# Patient Record
Sex: MALE | Race: WHITE | NOT HISPANIC OR LATINO | Employment: FULL TIME | URBAN - METROPOLITAN AREA
[De-identification: names, ages, dates, MRNs, and addresses within clinical notes are randomized per-mention and may not be internally consistent; named-entity substitution may affect disease eponyms.]

---

## 2021-03-26 ENCOUNTER — OFFICE VISIT (OUTPATIENT)
Dept: INTERNAL MEDICINE | Age: 46
End: 2021-03-26

## 2021-03-26 VITALS
SYSTOLIC BLOOD PRESSURE: 130 MMHG | DIASTOLIC BLOOD PRESSURE: 90 MMHG | HEIGHT: 73 IN | WEIGHT: 198.8 LBS | BODY MASS INDEX: 26.35 KG/M2 | OXYGEN SATURATION: 97 % | HEART RATE: 72 BPM | TEMPERATURE: 98.4 F

## 2021-03-26 DIAGNOSIS — Z11.59 NEED FOR HEPATITIS C SCREENING TEST: ICD-10-CM

## 2021-03-26 DIAGNOSIS — E34.9 TESTOSTERONE DEFICIENCY: ICD-10-CM

## 2021-03-26 DIAGNOSIS — Z12.5 PROSTATE CANCER SCREENING: ICD-10-CM

## 2021-03-26 DIAGNOSIS — Z76.89 ENCOUNTER TO ESTABLISH CARE WITH NEW DOCTOR: Primary | ICD-10-CM

## 2021-03-26 DIAGNOSIS — Z00.00 ROUTINE ADULT HEALTH MAINTENANCE: ICD-10-CM

## 2021-03-26 DIAGNOSIS — R03.0 ELEVATED BLOOD PRESSURE READING: ICD-10-CM

## 2021-03-26 PROBLEM — R74.8 ELEVATED LIVER ENZYMES: Status: ACTIVE | Noted: 2017-06-30

## 2021-03-26 PROBLEM — N28.1 RENAL CYST: Status: ACTIVE | Noted: 2017-09-06

## 2021-03-26 PROCEDURE — 99203 OFFICE O/P NEW LOW 30 MIN: CPT | Performed by: NURSE PRACTITIONER

## 2021-03-26 RX ORDER — AMOXICILLIN 875 MG/1
875 TABLET, COATED ORAL 2 TIMES DAILY
COMMUNITY
Start: 2021-03-18 | End: 2021-03-28

## 2021-03-26 RX ORDER — AZELASTINE 1 MG/ML
2 SPRAY, METERED NASAL
COMMUNITY
Start: 2021-03-18 | End: 2021-04-02

## 2021-03-26 NOTE — PROGRESS NOTES
"    I N T E R N A L  M E D I C I N E  ZAINAB WEISS      ENCOUNTER DATE:  03/26/2021    Ever Villagran / 45 y.o. / male      CHIEF COMPLAINT / REASON FOR OFFICE VISIT     Establish Care      ASSESSMENT & PLAN     1. Encounter to establish care with new doctor    2. Elevated blood pressure reading  -Given log to monitor blood pressures twice daily once in the morning at night and bring to next appointment approximately in 1 month.  We will determine at this point if blood pressure medication is warranted    3. Prostate cancer screening  - PSA Screen -with testosterone treatment    4. Routine adult health maintenance  - CBC & Differential  - Comprehensive Metabolic Panel  - Hemoglobin A1c  - Lipid Panel With / Chol / HDL Ratio  - TSH+Free T4    5. Testosterone deficiency  - Testosterone, Free, Total    6. Need for hepatitis C screening test  - Hepatitis C Antibody    Orders Placed This Encounter   Procedures   • Comprehensive Metabolic Panel   • Hemoglobin A1c   • Lipid Panel With / Chol / HDL Ratio   • TSH+Free T4   • PSA Screen   • Hepatitis C Antibody   • Testosterone, Free, Total   • CBC & Differential     No orders of the defined types were placed in this encounter.      SUMMARY/DISCUSSION  • Plan to follow-up in 1 month for annual physical and hypertension follow-up with fasting labs performed today.    Next Appointment with me: 4/27/2021    Return in about 1 month (around 4/26/2021) for Annual physical fasting labs today .      VITAL SIGNS     Visit Vitals  /90   Pulse 72   Temp 98.4 °F (36.9 °C) (Temporal)   Ht 185.4 cm (73\")   Wt 90.2 kg (198 lb 12.8 oz)   SpO2 97%   BMI 26.23 kg/m²       Wt Readings from Last 3 Encounters:   03/26/21 90.2 kg (198 lb 12.8 oz)     Body mass index is 26.23 kg/m².      MEDICATIONS AT THE TIME OF OFFICE VISIT     Current Outpatient Medications on File Prior to Visit   Medication Sig   • amoxicillin (AMOXIL) 875 MG tablet Take 875 mg by mouth 2 (two) times a day.   • " azelastine (ASTELIN) 0.1 % nasal spray 2 sprays into the nostril(s) as directed by provider.   • esomeprazole (NexIUM) 5 MG packet Take  by mouth.     No current facility-administered medications on file prior to visit.         HISTORY OF PRESENT ILLNESS     Patient presents to establish care with our office.  Has not seen previous nurse practitioner within [facility] since 2017.     He recently went to an urgent care center on 3/18 for blood pressure was elevated and they suggested he establish care with a PCP for monitoring.  Urinalysis was negative.  He was given amoxicillin 875 mg tablet twice daily for 10 days along with azelastine nasal spray for symptoms at urgent care.  He is seeking full lab work-up today.    GERD: Uses Nexium daily with complete relief of symptoms.    Surgical history of tonsillectomy.    Family history of heart disease, heart attack, COPD, fibromyalgia multiple sclerosis.  Significant history of tobacco abuse and family members.    Patient has never smoked, has not drink alcohol in approximately 11 years.  No substance abuse.  Monogamous relationship no STI screenings needed.    Use of testosterone injections.  Patient is purchasing himself he is not monitored through hormone clinic or other provider.  Needs monitoring of PSA, CBC and potentially DEXA scan in the future.  Suspect elevation of blood pressure may be partially related to testosterone use.  Patient is very active and lives a healthy lifestyle.  Patient states that testosterone was actually deficient before he started treatment.    CT of abdomen and pelvis in 2017 a dominant 4.3 cm cyst in the lower pole of the right kidney was seen, but favored a benign etiology.  Radiology recommended renal sonogram in 1 year for surveillance which has not been completed.    REVIEW OF SYSTEMS     Constitutional neg except per HPI   Resp neg  CV neg    PHYSICAL EXAMINATION     Physical Exam  Constitutional  No distress  Cardiovascular Rate   normal . Rhythm: regular . Heart sounds:  normal  Pulmonary/Chest  Effort normal. Breath sounds:  normal  Psychiatric  Alert. Judgment and thought content normal. Mood normal     REVIEWED DATA     Labs:         Imaging:           Medical Tests:             Summary of old records / correspondence / consultant report:           Request outside records:           *Examiner was wearing medical surgical mask, face shield and exam gloves during the entire duration of the visit. Patient was masked the entire time.   Minimum social distance of 6 ft maintained entire visit except if physical contact was necessary as documented.     **Dragon Disclaimer:   Much of this encounter note is an electronic transcription/translation of spoken language to printed text. The electronic translation of spoken language may permit erroneous, or at times, nonsensical words or phrases to be inadvertently transcribed. Although I have reviewed the note for such errors, some may still exist.

## 2021-03-30 LAB
ALBUMIN SERPL-MCNC: 4.5 G/DL (ref 3.5–5.2)
ALBUMIN/GLOB SERPL: 2.3 G/DL
ALP SERPL-CCNC: 44 U/L (ref 39–117)
ALT SERPL-CCNC: 46 U/L (ref 1–41)
AST SERPL-CCNC: 41 U/L (ref 1–40)
BASOPHILS # BLD AUTO: 0.03 10*3/MM3 (ref 0–0.2)
BASOPHILS NFR BLD AUTO: 0.5 % (ref 0–1.5)
BILIRUB SERPL-MCNC: 0.4 MG/DL (ref 0–1.2)
BUN SERPL-MCNC: 29 MG/DL (ref 6–20)
BUN/CREAT SERPL: 21.5 (ref 7–25)
CALCIUM SERPL-MCNC: 9.5 MG/DL (ref 8.6–10.5)
CHLORIDE SERPL-SCNC: 103 MMOL/L (ref 98–107)
CHOLEST SERPL-MCNC: 169 MG/DL (ref 0–200)
CHOLEST/HDLC SERPL: 6.26 {RATIO}
CO2 SERPL-SCNC: 31.2 MMOL/L (ref 22–29)
CREAT SERPL-MCNC: 1.35 MG/DL (ref 0.76–1.27)
EOSINOPHIL # BLD AUTO: 0.06 10*3/MM3 (ref 0–0.4)
EOSINOPHIL NFR BLD AUTO: 1 % (ref 0.3–6.2)
ERYTHROCYTE [DISTWIDTH] IN BLOOD BY AUTOMATED COUNT: 12.7 % (ref 12.3–15.4)
GLOBULIN SER CALC-MCNC: 2 GM/DL
GLUCOSE SERPL-MCNC: 92 MG/DL (ref 65–99)
HBA1C MFR BLD: 5.4 % (ref 4.8–5.6)
HCT VFR BLD AUTO: 52.3 % (ref 37.5–51)
HCV AB S/CO SERPL IA: <0.1 S/CO RATIO (ref 0–0.9)
HDLC SERPL-MCNC: 27 MG/DL (ref 40–60)
HGB BLD-MCNC: 18.3 G/DL (ref 13–17.7)
IMM GRANULOCYTES # BLD AUTO: 0.02 10*3/MM3 (ref 0–0.05)
IMM GRANULOCYTES NFR BLD AUTO: 0.3 % (ref 0–0.5)
LDLC SERPL CALC-MCNC: 123 MG/DL (ref 0–100)
LYMPHOCYTES # BLD AUTO: 1.64 10*3/MM3 (ref 0.7–3.1)
LYMPHOCYTES NFR BLD AUTO: 26.1 % (ref 19.6–45.3)
MCH RBC QN AUTO: 31.9 PG (ref 26.6–33)
MCHC RBC AUTO-ENTMCNC: 35 G/DL (ref 31.5–35.7)
MCV RBC AUTO: 91.3 FL (ref 79–97)
MONOCYTES # BLD AUTO: 0.48 10*3/MM3 (ref 0.1–0.9)
MONOCYTES NFR BLD AUTO: 7.6 % (ref 5–12)
NEUTROPHILS # BLD AUTO: 4.06 10*3/MM3 (ref 1.7–7)
NEUTROPHILS NFR BLD AUTO: 64.5 % (ref 42.7–76)
NRBC BLD AUTO-RTO: 0 /100 WBC (ref 0–0.2)
PLATELET # BLD AUTO: 162 10*3/MM3 (ref 140–450)
POTASSIUM SERPL-SCNC: 5 MMOL/L (ref 3.5–5.2)
PROT SERPL-MCNC: 6.5 G/DL (ref 6–8.5)
PSA SERPL-MCNC: 0.43 NG/ML (ref 0–4)
RBC # BLD AUTO: 5.73 10*6/MM3 (ref 4.14–5.8)
SODIUM SERPL-SCNC: 143 MMOL/L (ref 136–145)
T4 FREE SERPL-MCNC: 1.21 NG/DL (ref 0.93–1.7)
TESTOST FREE SERPL-MCNC: >50 PG/ML (ref 6.8–21.5)
TESTOST SERPL-MCNC: 710 NG/DL (ref 264–916)
TRIGL SERPL-MCNC: 101 MG/DL (ref 0–150)
TSH SERPL DL<=0.005 MIU/L-ACNC: 1.45 UIU/ML (ref 0.27–4.2)
VLDLC SERPL CALC-MCNC: 19 MG/DL (ref 5–40)
WBC # BLD AUTO: 6.29 10*3/MM3 (ref 3.4–10.8)

## 2021-04-05 ENCOUNTER — IMMUNIZATION (OUTPATIENT)
Dept: VACCINE CLINIC | Facility: HOSPITAL | Age: 46
End: 2021-04-05

## 2021-04-05 PROCEDURE — 91300 HC SARSCOV02 VAC 30MCG/0.3ML IM: CPT | Performed by: INTERNAL MEDICINE

## 2021-04-05 PROCEDURE — 0001A: CPT | Performed by: INTERNAL MEDICINE

## 2021-04-08 ENCOUNTER — TELEPHONE (OUTPATIENT)
Dept: INTERNAL MEDICINE | Age: 46
End: 2021-04-08

## 2021-04-08 NOTE — TELEPHONE ENCOUNTER
Caller: Ever Villagran CRISTI    Relationship: Self    Best call back number: [phone]    Caller requesting test results: PATIENT     What test was performed:BLOOD WORK    When was the test performed: 03/26/21    Where was the test performed: [facility]    Additional notes: THE PATIENT WOULD LIKE FOR HIS TEST RESULTS FOR HIS BLOOD WORK AND STATES THAT HE HAS NOT SEEN THE RESULTS ON HIS MY[facility]. THE PATIENT WOULD LIKE A COPY OF HIS RESULTS AND WHAT IS AVAILABLE.

## 2021-04-21 ENCOUNTER — TELEPHONE (OUTPATIENT)
Dept: INTERNAL MEDICINE | Age: 46
End: 2021-04-21

## 2021-04-21 NOTE — TELEPHONE ENCOUNTER
Caller: Ever Villagran    Relationship to patient: Self    Best call back number: [phone]    Patient is needing: PATIENT IS CALLING TO STATE THAT AFTER SEEING MS JACOBO, HE DONATED BLOOD AND RECEIVED A LETTER THAT HE TESTED POSITIVE FOR HEPATITIS B.  HE IS WANTING TO KNOW WHAT MS PATEL SUGGESTS.  HE IS ALSO WANTING TO KNOW IF HE CAN GET AN RX CALLED IN FOR AN ANTIBIOTIC FOR SINUS INFECTION.  HE STATES HE WAS TREATED AT THE URGENT CARE WITH AN ANTIBIOTIC BUT STILL HAS THE SINUS INFECTION.  HE STATES HE STILL COUGHING UP GREEN STUFF, PRESSURE IN HEAD AND ACHING IN TEETH.    PLEASE ADVISE.        [facility] [address] AT [facility] - [phone] [facility] [phone]   [phone]

## 2021-04-22 DIAGNOSIS — R74.8 ELEVATED LIVER ENZYMES: Primary | ICD-10-CM

## 2021-04-22 RX ORDER — AZITHROMYCIN 250 MG/1
TABLET, FILM COATED ORAL
Qty: 6 TABLET | Refills: 0 | Status: SHIPPED | OUTPATIENT
Start: 2021-04-22 | End: 2021-04-27

## 2021-04-26 ENCOUNTER — IMMUNIZATION (OUTPATIENT)
Dept: VACCINE CLINIC | Facility: HOSPITAL | Age: 46
End: 2021-04-26

## 2021-04-26 PROCEDURE — 91300 HC SARSCOV02 VAC 30MCG/0.3ML IM: CPT | Performed by: INTERNAL MEDICINE

## 2021-04-26 PROCEDURE — 0002A: CPT | Performed by: INTERNAL MEDICINE

## 2021-04-27 ENCOUNTER — OFFICE VISIT (OUTPATIENT)
Dept: INTERNAL MEDICINE | Age: 46
End: 2021-04-27

## 2021-04-27 VITALS
DIASTOLIC BLOOD PRESSURE: 72 MMHG | OXYGEN SATURATION: 97 % | WEIGHT: 203 LBS | HEART RATE: 80 BPM | TEMPERATURE: 98.4 F | SYSTOLIC BLOOD PRESSURE: 120 MMHG | BODY MASS INDEX: 26.9 KG/M2 | HEIGHT: 73 IN

## 2021-04-27 DIAGNOSIS — R74.8 ELEVATED LIVER ENZYMES: ICD-10-CM

## 2021-04-27 DIAGNOSIS — Z00.00 ENCOUNTER FOR ANNUAL PHYSICAL EXAM: Primary | ICD-10-CM

## 2021-04-27 DIAGNOSIS — L25.9 CONTACT DERMATITIS, UNSPECIFIED CONTACT DERMATITIS TYPE, UNSPECIFIED TRIGGER: ICD-10-CM

## 2021-04-27 DIAGNOSIS — N28.1 RENAL CYST, RIGHT: ICD-10-CM

## 2021-04-27 DIAGNOSIS — Z12.11 SCREENING FOR MALIGNANT NEOPLASM OF COLON: ICD-10-CM

## 2021-04-27 PROCEDURE — 99396 PREV VISIT EST AGE 40-64: CPT | Performed by: NURSE PRACTITIONER

## 2021-04-27 PROCEDURE — 99214 OFFICE O/P EST MOD 30 MIN: CPT | Performed by: NURSE PRACTITIONER

## 2021-04-27 RX ORDER — METHYLPREDNISOLONE 4 MG/1
TABLET ORAL
Qty: 1 EACH | Refills: 0 | Status: SHIPPED | OUTPATIENT
Start: 2021-04-27 | End: 2021-05-25

## 2021-04-27 NOTE — PROGRESS NOTES
"[facility]  Azam Frye, ZAINAB Huy / 46 y.o. / male  04/27/2021                        VITALS     Visit Vitals  /72   Pulse 80   Temp 98.4 °F (36.9 °C) (Temporal)   Ht 185.4 cm (73\")   Wt 92.1 kg (203 lb)   SpO2 97%   BMI 26.78 kg/m²       BP Readings from Last 3 Encounters:   04/27/21 120/72   03/26/21 130/90   11/23/20 (!) 160/101     Wt Readings from Last 3 Encounters:   04/27/21 92.1 kg (203 lb)   03/26/21 90.2 kg (198 lb 12.8 oz)      Body mass index is 26.78 kg/m².    MEDICATIONS     Current Outpatient Medications   Medication Sig Dispense Refill   • esomeprazole (NexIUM) 5 MG packet Take  by mouth.     • azithromycin (Zithromax Z-Wing) 250 MG tablet Take 2 tablets by mouth on day 1, then 1 tablet daily on days 2-5 6 tablet 0   • methylPREDNISolone (MEDROL) 4 MG dose pack Take as directed on package instructions. 1 each 0     No current facility-administered medications for this visit.       _____________________________________________________________________________________    CHIEF COMPLAINT     Annual Exam and Rash (BUE)      HISTORY OF PRESENT ILLNESS      Ever presents for annual health maintenance visit.    · Last health maintenance visit: many years ago  · General health: good  · Lifestyle:  · Attempting to lose weight?: No   · Diet: eats a well balanced, healthy diet  · Exercise: exercises nearly every day  · Tobacco: Never used   · Alcohol: does not drink  · Work: Full-time  · Reproductive health:  · Sexually active?: Yes   · Concern for STD?: No   · Sexual problems?: No problems   · Sees Urologist?: No   · Depression Screening:      PHQ-2/PHQ-9 Depression Screening 3/26/2021   Little interest or pleasure in doing things 0   Feeling down, depressed, or hopeless 0   Total Score 0         PHQ-2: 0 (Not depressed)     PHQ-9: 0 (Negative screening for depression)    Patient Care Team:  Azam Heller APRN as PCP - General (Internal " Medicine)  ______________________________________________________________________    ALLERGIES  No Known Allergies     PFSH:     The following portions of the patient's history were reviewed and updated as appropriate: Allergies / Current Medications / Past Medical History / Surgical History / Social History / Family History    PROBLEM LIST   Patient Active Problem List   Diagnosis   • Elevated liver enzymes   • Renal cyst       PAST MEDICAL HISTORY  Past Medical History:   Diagnosis Date   • GERD (gastroesophageal reflux disease)        SURGICAL HISTORY  Past Surgical History:   Procedure Laterality Date   • TONSILLECTOMY         SOCIAL HISTORY  Social History     Socioeconomic History   • Marital status:      Spouse name: Not on file   • Number of children: Not on file   • Years of education: Not on file   • Highest education level: Not on file   Tobacco Use   • Smoking status: Never Smoker   • Smokeless tobacco: Never Used   Vaping Use   • Vaping Use: Never used   Substance and Sexual Activity   • Alcohol use: Never     Comment: none 11 years    • Drug use: Never   • Sexual activity: Yes     Partners: Female     Birth control/protection: None       FAMILY HISTORY  Family History   Problem Relation Age of Onset   • Heart disease Mother    • Heart attack Mother    • Emphysema Father    • COPD Father    • Multiple sclerosis Sister    • Fibromyalgia Sister    • Heart attack Maternal Grandmother    • Heart attack Maternal Grandfather    • Emphysema Paternal Grandfather        IMMUNIZATION HISTORY  Immunization History   Administered Date(s) Administered   • COVID-19 (PFIZER) 04/05/2021, 04/26/2021       ______________________________________________________________________    REVIEW OF SYSTEMS     Review of Systems   Constitutional: Negative.    HENT: Negative.    Eyes: Negative.    Respiratory: Negative.    Cardiovascular: Negative.    Gastrointestinal: Negative.    Endocrine: Negative.    Genitourinary:  Negative.    Musculoskeletal: Negative.    Skin: Positive for rash.   Allergic/Immunologic: Negative.    Neurological: Negative.    Hematological: Negative.    Psychiatric/Behavioral: Negative.      PHYSICAL EXAMINATION     Physical Exam  Constitutional:       Appearance: Normal appearance.   HENT:      Head: Normocephalic.      Right Ear: Tympanic membrane normal.      Left Ear: Tympanic membrane normal.      Nose: Nose normal.      Mouth/Throat:      Mouth: Mucous membranes are moist.   Eyes:      Conjunctiva/sclera: Conjunctivae normal.      Pupils: Pupils are equal, round, and reactive to light.   Neck:      Thyroid: No thyromegaly.      Vascular: No carotid bruit.   Cardiovascular:      Rate and Rhythm: Normal rate and regular rhythm.      Pulses: Normal pulses.      Heart sounds: Normal heart sounds.   Pulmonary:      Effort: Pulmonary effort is normal.      Breath sounds: Normal breath sounds.   Abdominal:      Palpations: Abdomen is soft.      Tenderness: There is no right CVA tenderness or left CVA tenderness.   Genitourinary:     Comments: Defer until 51  Musculoskeletal:         General: Normal range of motion.      Cervical back: Normal range of motion.      Right lower leg: No edema.      Left lower leg: No edema.   Lymphadenopathy:      Cervical: No cervical adenopathy.   Skin:     Findings: Rash (bilateral forearm ) present.   Neurological:      Mental Status: He is alert and oriented to person, place, and time.   Psychiatric:         Thought Content: Thought content normal.         Judgment: Judgment normal.        REVIEWED DATA      Labs:    Lab Results   Component Value Date     03/26/2021    K 5.0 03/26/2021    CALCIUM 9.5 03/26/2021    AST 41 (H) 03/26/2021    ALT 46 (H) 03/26/2021    BUN 29 (H) 03/26/2021    CREATININE 1.35 (H) 03/26/2021    EGFRIFNONA 57 (L) 03/26/2021    EGFRIFAFRI 70 03/26/2021       Lab Results   Component Value Date    GLU 92 03/26/2021    HGBA1C 5.40 03/26/2021     TSH 1.450 03/26/2021    FREET4 1.21 03/26/2021       Lab Results   Component Value Date    PSA 0.432 03/26/2021    TESTOSTEROTT 710 03/26/2021    TESTFRE >50.0 (H) 03/26/2021       Lab Results   Component Value Date     (H) 03/26/2021    HDL 27 (L) 03/26/2021    TRIG 101 03/26/2021    CHOLHDLRATIO 6.26 03/26/2021       No components found for: [id number]    Lab Results   Component Value Date    WBC 6.29 03/26/2021    HGB 18.3 (H) 03/26/2021    MCV 91.3 03/26/2021     03/26/2021       No results found for: PROTEIN, GLUCOSEU, BLOODU, NITRITEU, LEUKOCYTESUR       Lab Results   Component Value Date    HEPCVIRUSABY <0.1 03/26/2021       Imaging:           Medical Tests:       ______________________________________________________________________    ASSESSMENT & PLAN     ANNUAL WELLNESS EXAM / PHYSICAL     Other medical problems addressed today:  Contact dermatitis: New chemical used in cleaning equipment at his work.  Bilateral pruritic and erythematous rash to bilateral forearms.  Has tried over-the-counter topical corticosteroid creams with no improvement.    4.3 cm right kidney cyst found on previous CT abdomen and pelvis in October 2017.  Follow-up is recommended in 1 year but was never completed.  Acceptable to renal ultrasound today.    Elevated liver enzymes: Mild elevation.  Patient recently attempted to get blood at the [facility] however hepatitis B core antibody test was reactive.  Hepatitis B surface antigen test was nonreactive along with HPV discriminatory KILEY.  He is advised to follow-up with his primary care provider with these findings.  No GI symptoms at this time.    Patient blood pressure pain: Has brought blood pressure log from home which revealed blood pressure in the 1 teens to 120s over 70s to 80s as an average.  Heart rate averages 60 to 70s.    Summary/Discussion:     · Recent Pfizer dose on April 26, commended hepatitis A along with tetanus at pharmacy.   · Continue  exercise regimen and healthy eating.  · With hepatitis B surface antigen test nonreactive hepatitis B core antibody test reactive we will check hepatitis B surface antibody to see if patient is immune or needs immunization of hepatitis B. liver enzymes were also mildly elevated we will recheck today.  · Blood pressure log shows normal blood pressure.  Need for medication at this time.  · Discussed annual physical labs which did show some elevation hemoglobin hematocrit likely due to testosterone use.  Testosterone 710 with injections.  Prostate is in normal range.  Otherwise unremarkable.  · Need for colonoscopy.  Acceptable for referral.  · Ordered ultrasound renal due to previous 4.3 cm right kidney cyst.  · Contact dermatitis, failed topical steroidal cream.  Order for Medrol pack.  Benadryl for itching.  · Plan for follow-up in 6 months for chronic medical follow-up and lab updates.    Next Appointment with me: Visit date not found    Return in about 6 months (around 10/27/2021) for Next scheduled follow up.      HEALTHCARE MAINTENANCE ISSUES       Cancer Screening:  · Colon: Initial/Next screening at age: 46  · Repeat colon cancer screening: N/A at this time  · Prostate: Continue screening annually.   · Testicular: Recommended monthly self exam  · Skin: Monthly self skin examination, annual exam by health professional  · Lung: Does not meet criteria for lung cancer screening.   · Other:    Screening Labs & Tests:  · Lab results reviewed & discussed with with patient or orders placed today.  · EKG:  · CV Screening: Lipid panel  · DEXA (75+ or risk factors):   · HEP C (If born [birth year]158-3546 or risk factors): Negative screen  · Other:     Immunization/Vaccinations (to be given today unless deferred by patient)  · Influenza: Patient missed the flu shot but plans to get it this season  · Hepatitis A: Recommended here or at pharmacy  · Tetanus/Pertussis: Recommended here or at pharmacy  · Pneumovax: Not needed at this  time  · Shingles: Not needed at this time  · Other:     Lifestyle Counseling:  · Lifestyle Modifications: Continue current health regimen.   · Safety Issues: Always wear seatbelt, Avoid texting while driving   · Use sunscreen, regular skin examination  · Recommended annual dental/vision examination.  · Emotional/Stress/Sleep: Reviewed and  given when appropriate      Health Maintenance   Topic Date Due   • COLONOSCOPY  Never done   • ANNUAL PHYSICAL  Never done   • TDAP/TD VACCINES (1 - Tdap) Never done   • INFLUENZA VACCINE  08/01/2021   • HEPATITIS C SCREENING  Completed   • COVID-19 Vaccine  Completed   • Pneumococcal Vaccine 0-64  Aged Out         Examiner was wearing KN95 mask, face shield and exam gloves during the entire duration of the visit. Patient was masked the entire time.   Minimum social distance of 6 ft maintained entire visit except if physical contact was necessary as documented.     **Dragon Disclaimer:   Much of this encounter note is an electronic transcription/translation of spoken language to printed text. The electronic translation of spoken language may permit erroneous, or at times, nonsensical words or phrases to be inadvertently transcribed. Although I have reviewed the note for such errors, some may still exist.

## 2021-04-28 LAB
ALBUMIN SERPL-MCNC: 4.4 G/DL (ref 3.5–5.2)
ALP SERPL-CCNC: 45 U/L (ref 39–117)
ALT SERPL-CCNC: 35 U/L (ref 1–41)
AST SERPL-CCNC: 42 U/L (ref 1–40)
BILIRUB DIRECT SERPL-MCNC: <0.2 MG/DL (ref 0–0.3)
BILIRUB SERPL-MCNC: 0.5 MG/DL (ref 0–1.2)
HBV SURFACE AB SER QL: REACTIVE
PROT SERPL-MCNC: 6.4 G/DL (ref 6–8.5)

## 2021-05-14 ENCOUNTER — HOSPITAL ENCOUNTER (OUTPATIENT)
Dept: ULTRASOUND IMAGING | Facility: HOSPITAL | Age: 46
Discharge: HOME | End: 2021-05-14
Admitting: NURSE PRACTITIONER

## 2021-05-14 DIAGNOSIS — N28.1 RENAL CYST, RIGHT: ICD-10-CM

## 2021-05-14 PROCEDURE — 76775 US EXAM ABDO BACK WALL LIM: CPT

## 2021-05-24 ENCOUNTER — TELEPHONE (OUTPATIENT)
Dept: INTERNAL MEDICINE | Age: 46
End: 2021-05-24

## 2021-05-24 NOTE — TELEPHONE ENCOUNTER
PATIENT'S DOCTOR IS NOT GOING BE ABLE TO GIVE HIS XRAYS TO HIM UNTIL THIS EVENING SO HE WON'T BE ABLE TO GET THEM TO [facility] UNTIL IN THE MORNING.     CONTACT: [phone] (h)

## 2021-05-24 NOTE — TELEPHONE ENCOUNTER
Pt sending images at this time.  C/O sharp pain with breathing at T-10,  Injury noted 10 days ago at work turned wrong at the waist. Stated swelling noted on X-ray.  Did not go to [facility], went to Chiropractor.   Denied numbness, tingling, leg pain

## 2021-05-24 NOTE — TELEPHONE ENCOUNTER
"Messaged patient directly:     \"I just saw the messages that I am unable to see your xrays until tomorrow.     I will take a look as soon as I see them.     However, I may need further imaging or xrays if we need to treat your pain or symptoms. I have some same day availability if you need to come see.     Feel better!\"  "

## 2021-05-24 NOTE — TELEPHONE ENCOUNTER
Caller: Tyshawn Ever L    Relationship: Self    Best call back number: [phone]    What medication are you requesting: STEROID OR ANTI INFLAMMATORY    What are your current symptoms:SEVERE BACK PAIN AND RIB OUT OF PLACE    How long have you been experiencing symptoms: ABOUT 10 DAYS    If a prescription is needed, what is your preferred pharmacy and phone number:  [facility] [address] [facility] [address] AT [facility] & ([facility] [phone] [facility] [phone]    Additional notes:PATIENT STATES THAT HIS BACK HAS BEEN HURTING FOR ABOUT 10 DAYS. HE WENT FOR AN X-RAY AND STATED THAT HE HAS THOSE RESULTS IF YOU WOULD LIKE TO SEE THEM. PATIENT STATES THAT HE HAS A CHIROPRACTOR APPOINTMENT TODAY AS WELL. PLEASE CALL PATIENT AND ADVISE.

## 2021-05-25 ENCOUNTER — OFFICE VISIT (OUTPATIENT)
Dept: INTERNAL MEDICINE | Age: 46
End: 2021-05-25

## 2021-05-25 VITALS
DIASTOLIC BLOOD PRESSURE: 86 MMHG | OXYGEN SATURATION: 97 % | HEART RATE: 69 BPM | SYSTOLIC BLOOD PRESSURE: 130 MMHG | BODY MASS INDEX: 27.25 KG/M2 | TEMPERATURE: 97.7 F | WEIGHT: 205.6 LBS | HEIGHT: 73 IN

## 2021-05-25 DIAGNOSIS — S23.9XXA THORACIC BACK SPRAIN, INITIAL ENCOUNTER: Primary | ICD-10-CM

## 2021-05-25 PROCEDURE — 96372 THER/PROPH/DIAG INJ SC/IM: CPT | Performed by: NURSE PRACTITIONER

## 2021-05-25 PROCEDURE — 99213 OFFICE O/P EST LOW 20 MIN: CPT | Performed by: NURSE PRACTITIONER

## 2021-05-25 RX ORDER — CYCLOBENZAPRINE HCL 5 MG
5 TABLET ORAL 3 TIMES DAILY PRN
Qty: 15 TABLET | Refills: 0 | Status: SHIPPED | OUTPATIENT
Start: 2021-05-25 | End: 2021-08-19

## 2021-05-25 RX ORDER — METHYLPREDNISOLONE ACETATE 80 MG/ML
80 INJECTION, SUSPENSION INTRA-ARTICULAR; INTRALESIONAL; INTRAMUSCULAR; SOFT TISSUE ONCE
Status: COMPLETED | OUTPATIENT
Start: 2021-05-25 | End: 2021-05-25

## 2021-05-25 RX ADMIN — METHYLPREDNISOLONE ACETATE 80 MG: 80 INJECTION, SUSPENSION INTRA-ARTICULAR; INTRALESIONAL; INTRAMUSCULAR; SOFT TISSUE at 13:08

## 2021-05-25 NOTE — PROGRESS NOTES
"    I N T E R N A L  M E D I C I N E  MARA JACOBO, APRN      ENCOUNTER DATE:  05/25/2021    Ever Villagran / 46 y.o. / male      CHIEF COMPLAINT / REASON FOR OFFICE VISIT     Back Pain      ASSESSMENT & PLAN     1. Thoracic back sprain, initial encounter  - methylPREDNISolone acetate (DEPO-medrol) injection 80 mg  - Flexeril 5mg TID PRN     No orders of the defined types were placed in this encounter.    New Medications Ordered This Visit   Medications   • cyclobenzaprine (FLEXERIL) 5 MG tablet     Sig: Take 1 tablet by mouth 3 (Three) Times a Day As Needed for Muscle Spasms.     Dispense:  15 tablet     Refill:  0   • methylPREDNISolone acetate (DEPO-medrol) injection 80 mg       SUMMARY/DISCUSSION  • Follow-up if symptoms do not improve or worsen      Next Appointment with me: 10/28/2021    No follow-ups on file.      VITAL SIGNS     Visit Vitals  /86   Pulse 69   Temp 97.7 °F (36.5 °C) (Temporal)   Ht 185.4 cm (73\")   Wt 93.3 kg (205 lb 9.6 oz)   SpO2 97%   BMI 27.13 kg/m²     Wt Readings from Last 3 Encounters:   05/25/21 93.3 kg (205 lb 9.6 oz)   04/27/21 92.1 kg (203 lb)   03/26/21 90.2 kg (198 lb 12.8 oz)     Body mass index is 27.13 kg/m².      MEDICATIONS AT THE TIME OF OFFICE VISIT     Current Outpatient Medications on File Prior to Visit   Medication Sig   • esomeprazole (NexIUM) 5 MG packet Take  by mouth.   • [DISCONTINUED] methylPREDNISolone (MEDROL) 4 MG dose pack Take as directed on package instructions.     No current facility-administered medications on file prior to visit.         HISTORY OF PRESENT ILLNESS     Patient presents with left thoracic back pain over the last few days after labor-intensive day with the patient.  He has been going to his chiropractor who is performed imaging of his spine.  Chiropractor is suggesting steroid injection to decrease inflammation along with tightness of his muscles.  No numbness or tingling bilateral lower extremities or loss of bowel or bladder.  Minimal " decreased range of motion due to pain.     REVIEW OF SYSTEMS     Constitutional neg except per HPI   Resp neg  CV neg  Musc thoracic pain and tightness     PHYSICAL EXAMINATION     Physical Exam  Constitutional  No distress  Cardiovascular Rate  normal . Rhythm: regular . Heart sounds:  normal  Pulmonary/Chest  Effort normal. Breath sounds:  normal  Musc left thoracic pain with palpation   Psychiatric  Alert. Judgment and thought content normal. Mood normal     REVIEWED DATA     Labs:   Lab Results   Component Value Date    BUN 29 (H) 03/26/2021    CREATININE 1.35 (H) 03/26/2021    EGFRIFNONA 57 (L) 03/26/2021    EGFRIFAFRI 70 03/26/2021    BCR 21.5 03/26/2021    K 5.0 03/26/2021    CO2 31.2 (H) 03/26/2021    CALCIUM 9.5 03/26/2021    PROTENTOTREF 6.4 04/27/2021    ALBUMIN 4.40 04/27/2021    LABIL2 2.3 03/26/2021    AST 42 (H) 04/27/2021    ALT 35 04/27/2021       Imaging:           Medical Tests:             Summary of old records / correspondence / consultant report:           Request outside records:           *Examiner was wearing medical surgical mask, face shield and exam gloves during the entire duration of the visit. Patient was masked the entire time.   Minimum social distance of 6 ft maintained entire visit except if physical contact was necessary as documented.     **Dragon Disclaimer:   Much of this encounter note is an electronic transcription/translation of spoken language to printed text. The electronic translation of spoken language may permit erroneous, or at times, nonsensical words or phrases to be inadvertently transcribed. Although I have reviewed the note for such errors, some may still exist.

## 2021-06-01 ENCOUNTER — HOSPITAL ENCOUNTER (OUTPATIENT)
Dept: GENERAL RADIOLOGY | Facility: HOSPITAL | Age: 46
Discharge: HOME | End: 2021-06-01

## 2021-06-01 DIAGNOSIS — M54.50 LUMBAR PAIN: Primary | ICD-10-CM

## 2021-06-01 DIAGNOSIS — M54.50 LUMBAR PAIN: ICD-10-CM

## 2021-06-01 DIAGNOSIS — M54.6 THORACIC BACK PAIN, UNSPECIFIED BACK PAIN LATERALITY, UNSPECIFIED CHRONICITY: ICD-10-CM

## 2021-06-01 PROCEDURE — 72100 X-RAY EXAM L-S SPINE 2/3 VWS: CPT

## 2021-06-01 PROCEDURE — 72072 X-RAY EXAM THORAC SPINE 3VWS: CPT

## 2021-06-02 DIAGNOSIS — M51.34 DDD (DEGENERATIVE DISC DISEASE), THORACIC: Primary | ICD-10-CM

## 2021-06-02 DIAGNOSIS — M51.36 DDD (DEGENERATIVE DISC DISEASE), LUMBAR: ICD-10-CM

## 2021-06-15 ENCOUNTER — TELEPHONE (OUTPATIENT)
Dept: INTERNAL MEDICINE | Age: 46
End: 2021-06-15

## 2021-06-15 NOTE — TELEPHONE ENCOUNTER
----- Message from ZAINAB Allan sent at 6/14/2021  8:30 AM EDT -----  MRI revised to external. Ready to fax to [facility]

## 2021-07-06 ENCOUNTER — APPOINTMENT (OUTPATIENT)
Dept: MRI IMAGING | Facility: HOSPITAL | Age: 46
End: 2021-07-06

## 2021-07-06 ENCOUNTER — HOSPITAL ENCOUNTER (OUTPATIENT)
Dept: MRI IMAGING | Facility: HOSPITAL | Age: 46
Discharge: HOME | End: 2021-07-06
Admitting: NURSE PRACTITIONER

## 2021-07-06 DIAGNOSIS — M51.36 DDD (DEGENERATIVE DISC DISEASE), LUMBAR: ICD-10-CM

## 2021-07-06 PROCEDURE — 72148 MRI LUMBAR SPINE W/O DYE: CPT

## 2021-07-14 ENCOUNTER — TELEPHONE (OUTPATIENT)
Dept: INTERNAL MEDICINE | Age: 46
End: 2021-07-14

## 2021-07-14 RX ORDER — TRIAMCINOLONE ACETONIDE 0.25 MG/G
OINTMENT TOPICAL 2 TIMES DAILY
Qty: 15 G | Refills: 1 | Status: SHIPPED | OUTPATIENT
Start: 2021-07-14 | End: 2021-07-15 | Stop reason: SDUPTHER

## 2021-07-14 NOTE — TELEPHONE ENCOUNTER
Patient sent msg below. We have no [facility] listed in his chart so I sent him a msg asking him what [facility] he is using. You may have to check for the msg in Patient Advise.       Ok did you send that to the [facility]? [facility] doesn't take my insurance that I have currently. My new insurance starts august 1st.

## 2021-07-15 RX ORDER — TRIAMCINOLONE ACETONIDE 0.25 MG/G
OINTMENT TOPICAL 2 TIMES DAILY
Qty: 15 G | Refills: 1 | Status: SHIPPED | OUTPATIENT
Start: 2021-07-15 | End: 2021-07-29

## 2021-08-19 ENCOUNTER — TELEPHONE (OUTPATIENT)
Dept: INTERNAL MEDICINE | Age: 46
End: 2021-08-19

## 2021-08-19 NOTE — TELEPHONE ENCOUNTER
----- Message from Aleksandra Jones LPN sent at 8/19/2021  6:56 AM EDT -----  Regarding: FW: Non-Urgent Medical Question  Contact: [phone]    ----- Message -----  From: Ever Villagran  Sent: 8/18/2021  12:25 PM EDT  To: [facility]  Subject: Non-Urgent Medical Question                      Anything for meek? I know sounds weird and out of the ordinary, but I have pinkeye.

## 2021-09-21 ENCOUNTER — OFFICE VISIT (OUTPATIENT)
Dept: INTERNAL MEDICINE | Age: 46
End: 2021-09-21

## 2021-09-21 ENCOUNTER — LAB (OUTPATIENT)
Dept: LAB | Facility: HOSPITAL | Age: 46
End: 2021-09-21

## 2021-09-21 ENCOUNTER — HOSPITAL ENCOUNTER (OUTPATIENT)
Dept: GENERAL RADIOLOGY | Facility: HOSPITAL | Age: 46
Discharge: HOME | End: 2021-09-21

## 2021-09-21 VITALS
TEMPERATURE: 97.8 F | OXYGEN SATURATION: 99 % | HEART RATE: 68 BPM | DIASTOLIC BLOOD PRESSURE: 84 MMHG | WEIGHT: 200.6 LBS | SYSTOLIC BLOOD PRESSURE: 132 MMHG | HEIGHT: 73 IN | BODY MASS INDEX: 26.59 KG/M2

## 2021-09-21 DIAGNOSIS — R05.9 COUGH: Primary | ICD-10-CM

## 2021-09-21 LAB
BASOPHILS # BLD AUTO: 0.03 10*3/MM3 (ref 0–0.2)
BASOPHILS NFR BLD AUTO: 0.5 % (ref 0–1.5)
DEPRECATED RDW RBC AUTO: 43.9 FL (ref 37–54)
EOSINOPHIL # BLD AUTO: 0.07 10*3/MM3 (ref 0–0.4)
EOSINOPHIL NFR BLD AUTO: 1.1 % (ref 0.3–6.2)
ERYTHROCYTE [DISTWIDTH] IN BLOOD BY AUTOMATED COUNT: 13.8 % (ref 12.3–15.4)
HCT VFR BLD AUTO: 49.6 % (ref 37.5–51)
HGB BLD-MCNC: 16.9 G/DL (ref 13–17.7)
IMM GRANULOCYTES # BLD AUTO: 0.01 10*3/MM3 (ref 0–0.05)
IMM GRANULOCYTES NFR BLD AUTO: 0.2 % (ref 0–0.5)
LYMPHOCYTES # BLD AUTO: 1.67 10*3/MM3 (ref 0.7–3.1)
LYMPHOCYTES NFR BLD AUTO: 25.7 % (ref 19.6–45.3)
MCH RBC QN AUTO: 30.2 PG (ref 26.6–33)
MCHC RBC AUTO-ENTMCNC: 34.1 G/DL (ref 31.5–35.7)
MCV RBC AUTO: 88.6 FL (ref 79–97)
MONOCYTES # BLD AUTO: 0.56 10*3/MM3 (ref 0.1–0.9)
MONOCYTES NFR BLD AUTO: 8.6 % (ref 5–12)
NEUTROPHILS NFR BLD AUTO: 4.17 10*3/MM3 (ref 1.7–7)
NEUTROPHILS NFR BLD AUTO: 63.9 % (ref 42.7–76)
NRBC BLD AUTO-RTO: 0 /100 WBC (ref 0–0.2)
PLATELET # BLD AUTO: 178 10*3/MM3 (ref 140–450)
PMV BLD AUTO: 11 FL (ref 6–12)
RBC # BLD AUTO: 5.6 10*6/MM3 (ref 4.14–5.8)
WBC # BLD AUTO: 6.51 10*3/MM3 (ref 3.4–10.8)

## 2021-09-21 PROCEDURE — 85025 COMPLETE CBC W/AUTO DIFF WBC: CPT | Performed by: NURSE PRACTITIONER

## 2021-09-21 PROCEDURE — 36415 COLL VENOUS BLD VENIPUNCTURE: CPT | Performed by: NURSE PRACTITIONER

## 2021-09-21 PROCEDURE — 99213 OFFICE O/P EST LOW 20 MIN: CPT | Performed by: NURSE PRACTITIONER

## 2021-09-21 PROCEDURE — 71046 X-RAY EXAM CHEST 2 VIEWS: CPT

## 2021-09-21 RX ORDER — PROMETHAZINE HYDROCHLORIDE AND CODEINE PHOSPHATE 6.25; 1 MG/5ML; MG/5ML
5 SYRUP ORAL EVERY 4 HOURS PRN
Qty: 118 ML | Refills: 0 | Status: SHIPPED | OUTPATIENT
Start: 2021-09-21 | End: 2021-10-28

## 2021-09-21 RX ORDER — BENZONATATE 100 MG/1
100 CAPSULE ORAL 3 TIMES DAILY PRN
Qty: 30 CAPSULE | Refills: 1 | Status: SHIPPED | OUTPATIENT
Start: 2021-09-21 | End: 2021-10-28

## 2021-09-21 NOTE — PROGRESS NOTES
"    I N T E R N A L  M E D I C I N E  MARA JACOBO, APRN      ENCOUNTER DATE:  09/21/2021    Ever Villagran / 46 y.o. / male      CHIEF COMPLAINT / REASON FOR OFFICE VISIT     Cough (6 wk post exposure Covid 19) and Labs Only      ASSESSMENT & PLAN     1. Cough  -Tessalon Perles as needed for daytime cough  - CBC w AUTO Differential  - promethazine-codeine (PHENERGAN with CODEINE) 6.25-10 MG/5ML syrup; Take 5 mL by mouth Every 4 (Four) Hours As Needed for Cough.  Dispense: 118 mL; Refill: 0  - XR Chest PA & Lateral    Orders Placed This Encounter   Procedures   • XR Chest PA & Lateral   • CBC Auto Differential   • CBC w AUTO Differential     New Medications Ordered This Visit   Medications   • promethazine-codeine (PHENERGAN with CODEINE) 6.25-10 MG/5ML syrup     Sig: Take 5 mL by mouth Every 4 (Four) Hours As Needed for Cough.     Dispense:  118 mL     Refill:  0   • benzonatate (Tessalon Perles) 100 MG capsule     Sig: Take 1 capsule by mouth 3 (Three) Times a Day As Needed for Cough.     Dispense:  30 capsule     Refill:  1       SUMMARY/DISCUSSION  • if negative chest x-ray along with CBC, if symptoms do not improve with cough suppressants will consider pulmonary function test for further evaluation.  • Continue Trelegy as given by NP peer.     Next Appointment with me: 10/28/2021    No follow-ups on file.      VITAL SIGNS     Visit Vitals  /84 (Cuff Size: Large Adult)   Pulse 68   Temp 97.8 °F (36.6 °C) (Temporal)   Ht 185.4 cm (73\")   Wt 91 kg (200 lb 9.6 oz)   SpO2 99%   BMI 26.47 kg/m²       @BP@  Wt Readings from Last 3 Encounters:   09/21/21 91 kg (200 lb 9.6 oz)   05/25/21 93.3 kg (205 lb 9.6 oz)   04/27/21 92.1 kg (203 lb)     Body mass index is 26.47 kg/m².      MEDICATIONS AT THE TIME OF OFFICE VISIT     Current Outpatient Medications on File Prior to Visit   Medication Sig   • esomeprazole (NexIUM) 5 MG packet Take  by mouth.   • [DISCONTINUED] azithromycin (ZITHROMAX) 250 MG tablet Take 2 tablets " the first day, then 1 tablet daily for 4 days.     No current facility-administered medications on file prior to visit.         HISTORY OF PRESENT ILLNESS     Patient presents with approximately 6 weeks post Covid nonproductive cough. No significant shortness of breath. No fever, chills or malaise. No significant chest tightness but he does feel as if he cannot get a fully deep breath. Decompensation as far as his normal exercise regimen. He prescribed cough syrup from urgent care which has not been showing improvement. He has been using a Trelegy inhaler in which he was given by a nurse practitioner friend at his wellness practice. This has been improving his symptoms. Overall, he states that his symptoms are improving slowly rather than worsening.     REVIEW OF SYSTEMS     Constitutional neg except per HPI   Resp neg  CV dry cough    PHYSICAL EXAMINATION     Physical Exam  Constitutional  No distress  Cardiovascular Rate  normal . Rhythm: regular . Heart sounds:  normal  Pulmonary/Chest  Effort normal. Breath sounds:  normal  Psychiatric  Alert. Judgment and thought content normal. Mood normal       REVIEWED DATA     Labs:         Imaging:           Medical Tests:             Summary of old records / correspondence / consultant report:           Request outside records:           *Examiner was wearing medical surgical mask, face shield and exam gloves during the entire duration of the visit. Patient was masked the entire time.   Minimum social distance of 6 ft maintained entire visit except if physical contact was necessary as documented.     **Dragon Disclaimer:   Much of this encounter note is an electronic transcription/translation of spoken language to printed text. The electronic translation of spoken language may permit erroneous, or at times, nonsensical words or phrases to be inadvertently transcribed. Although I have reviewed the note for such errors, some may still exist.

## 2021-09-22 RX ORDER — ESOMEPRAZOLE MAGNESIUM 40 MG/1
40 FOR SUSPENSION ORAL
Qty: 90 EACH | Refills: 1 | Status: SHIPPED | OUTPATIENT
Start: 2021-09-22 | End: 2022-04-28 | Stop reason: SDUPTHER

## 2021-10-01 DIAGNOSIS — M54.2 CERVICAL PAIN: Primary | ICD-10-CM

## 2021-10-04 ENCOUNTER — HOSPITAL ENCOUNTER (OUTPATIENT)
Dept: GENERAL RADIOLOGY | Facility: HOSPITAL | Age: 46
Discharge: HOME | End: 2021-10-04
Admitting: NURSE PRACTITIONER

## 2021-10-04 DIAGNOSIS — M54.2 CERVICAL PAIN: ICD-10-CM

## 2021-10-04 PROCEDURE — 72040 X-RAY EXAM NECK SPINE 2-3 VW: CPT

## 2021-10-05 DIAGNOSIS — M50.30 DDD (DEGENERATIVE DISC DISEASE), CERVICAL: Primary | ICD-10-CM

## 2021-10-07 ENCOUNTER — TELEPHONE (OUTPATIENT)
Dept: INTERNAL MEDICINE | Age: 46
End: 2021-10-07

## 2021-10-07 NOTE — TELEPHONE ENCOUNTER
[facility] Authorization Dept called asking if you can call and go over a couple more items so that the patient can have his MRI on Sunday.     Phone # [phone]    Ref # [id number]

## 2021-10-09 ENCOUNTER — TRANSCRIBE ORDERS (OUTPATIENT)
Dept: ADMINISTRATIVE | Facility: HOSPITAL | Age: 46
End: 2021-10-09

## 2021-10-10 ENCOUNTER — APPOINTMENT (OUTPATIENT)
Dept: MRI IMAGING | Facility: HOSPITAL | Age: 46
End: 2021-10-10

## 2021-10-14 ENCOUNTER — TELEPHONE (OUTPATIENT)
Dept: INTERNAL MEDICINE | Age: 46
End: 2021-10-14

## 2021-10-14 NOTE — TELEPHONE ENCOUNTER
Religious called stating patients MRI was denied and they need a Peer to Peer please.     For Peer to Peer call [phone] opt [phone]

## 2021-10-14 NOTE — TELEPHONE ENCOUNTER
Schedule peer to peer review for today at 1145.  Patient informed that location is also not covered by insurance and he will need to call [facility] for available locations to have MRI performed if approved by [id number].

## 2021-10-14 NOTE — TELEPHONE ENCOUNTER
Pt has been made aware of this info. He is currently discussing situation with provider via [employer] messaging. MONICA

## 2021-10-22 ENCOUNTER — HOSPITAL ENCOUNTER (OUTPATIENT)
Dept: MRI IMAGING | Facility: HOSPITAL | Age: 46
End: 2021-10-22

## 2021-10-28 ENCOUNTER — OFFICE VISIT (OUTPATIENT)
Dept: INTERNAL MEDICINE | Age: 46
End: 2021-10-28

## 2021-10-28 VITALS
SYSTOLIC BLOOD PRESSURE: 118 MMHG | TEMPERATURE: 97.3 F | HEART RATE: 65 BPM | DIASTOLIC BLOOD PRESSURE: 80 MMHG | BODY MASS INDEX: 26.64 KG/M2 | HEIGHT: 73 IN | OXYGEN SATURATION: 97 % | WEIGHT: 201 LBS

## 2021-10-28 DIAGNOSIS — M54.2 CERVICAL PAIN: Primary | ICD-10-CM

## 2021-10-28 DIAGNOSIS — R74.8 ELEVATED LIVER ENZYMES: ICD-10-CM

## 2021-10-28 PROCEDURE — 99213 OFFICE O/P EST LOW 20 MIN: CPT | Performed by: NURSE PRACTITIONER

## 2021-10-28 NOTE — PROGRESS NOTES
"    I N T E R N A L  M E D I C I N E  ZAINAB WEISS      ENCOUNTER DATE:  10/28/2021    Ever Villagran / 46 y.o. / male      CHIEF COMPLAINT / REASON FOR OFFICE VISIT     Elevated Hepatic Enzymes (6 month f/u) and Back Pain (approval for MRI)      ASSESSMENT & PLAN     1.  Cervical pain  -Continue physical therapy along with office visits to chiropractor  -NSAIDs as needed  -Order for MRI of the cervical spine has been placed and is pending    2. Elevated liver enzymes  - Comprehensive Metabolic Panel    Orders Placed This Encounter   Procedures   • Comprehensive Metabolic Panel     No orders of the defined types were placed in this encounter.      SUMMARY/DISCUSSION  • Follow-up in 6 weeks for evaluation of cervical pain with continued physical therapy      Next Appointment with me: 12/9/2021    Return in about 6 weeks (around 12/9/2021) for Next scheduled follow up.      VITAL SIGNS     Visit Vitals  /80 (Cuff Size: Large Adult)   Pulse 65   Temp 97.3 °F (36.3 °C) (Temporal)   Ht 185.4 cm (73\")   Wt 91.2 kg (201 lb)   SpO2 97%   BMI 26.52 kg/m²     Wt Readings from Last 3 Encounters:   10/28/21 91.2 kg (201 lb)   09/21/21 91 kg (200 lb 9.6 oz)   05/25/21 93.3 kg (205 lb 9.6 oz)     Body mass index is 26.52 kg/m².      MEDICATIONS AT THE TIME OF OFFICE VISIT     Current Outpatient Medications on File Prior to Visit   Medication Sig   • esomeprazole (nexIUM) 40 MG packet Take 40 mg by mouth Every Morning Before Breakfast.   • [DISCONTINUED] benzonatate (Tessalon Perles) 100 MG capsule Take 1 capsule by mouth 3 (Three) Times a Day As Needed for Cough.   • [DISCONTINUED] promethazine-codeine (PHENERGAN with CODEINE) 6.25-10 MG/5ML syrup Take 5 mL by mouth Every 4 (Four) Hours As Needed for Cough.     No current facility-administered medications on file prior to visit.         HISTORY OF PRESENT ILLNESS     Patient presents for chronic cervical pain.  No radicular symptoms or numbness and tingling but he does " have significant weakness to his left arm.  He has been seeing the chiropractor regularly over the course the last few months for additional pain of his lumbar and thoracic with ongoing cervical pain.  He has been attending physical therapy for cervical spine pain and left-sided weakness over the course the last 4 weeks.  NSAIDs intermittently with some moderate relief.  X-ray of the cervical spine completed on October 1 which showed borderline retrolisthesis of C4 on C5 and multilevel endplate spurring and disc space narrowing at C5-C6 and mildly at C4-C5.     REVIEW OF SYSTEMS     Constitutional neg except per HPI   Resp neg  CV neg  Musc cervical pain  Neuro left upper extremity weakness    PHYSICAL EXAMINATION     Physical Exam  Constitutional  No distress  Cardiovascular Rate  normal . Rhythm: regular . Heart sounds:  normal  Pulmonary/Chest  Effort normal. Breath sounds:  normal  Musc left upper extremity weakness compared to right side  Neuro +2 bilateral radial pulse  Psychiatric  Alert. Judgment and thought content normal. Mood normal       REVIEWED DATA     Labs:   Lab Results   Component Value Date    BUN 29 (H) 03/26/2021    CREATININE 1.35 (H) 03/26/2021    EGFRIFNONA 57 (L) 03/26/2021    EGFRIFAFRI 70 03/26/2021    BCR 21.5 03/26/2021    K 5.0 03/26/2021    CO2 31.2 (H) 03/26/2021    CALCIUM 9.5 03/26/2021    PROTENTOTREF 6.4 04/27/2021    ALBUMIN 4.40 04/27/2021    LABIL2 2.3 03/26/2021    AST 42 (H) 04/27/2021    ALT 35 04/27/2021     Imaging:   XR SPINE CERVICAL 2 OR 3 VW-     INDICATIONS: Cervical pain and radiculopathy.     TECHNIQUE: 2 views of the cervical spine     COMPARISON: None available     FINDINGS:     Reversed curve of the cervical spine may be chronic in nature or could  be evidence of muscle spasm. Slight/borderline retrolisthesis of C4 on  C5. No acute fracture or abnormal prevertebral soft tissue swelling is  identified. Multilevel endplate spurring is present. Disc space  narrowing  is seen at C5/6, mildly at C4/5.     IMPRESSION:     No acute fracture is identified. Degenerative changes. If there is  further clinical concern, MRI could be considered for further  evaluation.     This report was finalized on 10/4/2021 12:46 PM by Dr. Moo Landry M.D.    Medical Tests:             Summary of old records / correspondence / consultant report:           Request outside records:           *Examiner was wearing medical surgical mask, face shield and exam gloves during the entire duration of the visit. Patient was masked the entire time.   Minimum social distance of 6 ft maintained entire visit except if physical contact was necessary as documented.     **Dragon Disclaimer:   Much of this encounter note is an electronic transcription/translation of spoken language to printed text. The electronic translation of spoken language may permit erroneous, or at times, nonsensical words or phrases to be inadvertently transcribed. Although I have reviewed the note for such errors, some may still exist.

## 2021-10-29 LAB
ALBUMIN SERPL-MCNC: 4.5 G/DL (ref 4–5)
ALBUMIN/GLOB SERPL: 2.3 {RATIO} (ref 1.2–2.2)
ALP SERPL-CCNC: 47 IU/L (ref 44–121)
ALT SERPL-CCNC: 33 IU/L (ref 0–44)
AST SERPL-CCNC: 37 IU/L (ref 0–40)
BILIRUB SERPL-MCNC: 0.4 MG/DL (ref 0–1.2)
BUN SERPL-MCNC: 23 MG/DL (ref 6–24)
BUN/CREAT SERPL: 19 (ref 9–20)
CALCIUM SERPL-MCNC: 9.3 MG/DL (ref 8.7–10.2)
CHLORIDE SERPL-SCNC: 101 MMOL/L (ref 96–106)
CO2 SERPL-SCNC: 25 MMOL/L (ref 20–29)
CREAT SERPL-MCNC: 1.22 MG/DL (ref 0.76–1.27)
GLOBULIN SER CALC-MCNC: 2 G/DL (ref 1.5–4.5)
GLUCOSE SERPL-MCNC: 71 MG/DL (ref 65–99)
POTASSIUM SERPL-SCNC: 5 MMOL/L (ref 3.5–5.2)
PROT SERPL-MCNC: 6.5 G/DL (ref 6–8.5)
SODIUM SERPL-SCNC: 141 MMOL/L (ref 134–144)

## 2021-11-08 ENCOUNTER — TELEPHONE (OUTPATIENT)
Dept: INTERNAL MEDICINE | Age: 46
End: 2021-11-08

## 2021-11-08 NOTE — TELEPHONE ENCOUNTER
Charisse with MRI ph: [phone], patient is approved for MRI but not at [facility] due to insurance plan. Insurance needs to be evaluated and approved location as well. MD needs to explain to insurance why [facility] location is necessary. Call Charisse back by 2:30 today if poss.

## 2021-11-08 NOTE — TELEPHONE ENCOUNTER
Charisse has been notified of ZAINAB Heller dictation. Charisse stated that the scheduling dept will pass on to patient so this MRI can be done at outside source. MONICA

## 2021-11-08 NOTE — TELEPHONE ENCOUNTER
PATIENT IS FOLLOWING UP ON THIS ISSUE. HE STATES THAT HIS MRI THAT WAS SCHEDULED FOR TOMORROW WAS CANCELED.    PLEASE ADVISE.

## 2021-11-09 ENCOUNTER — APPOINTMENT (OUTPATIENT)
Dept: MRI IMAGING | Facility: HOSPITAL | Age: 46
End: 2021-11-09

## 2021-11-12 ENCOUNTER — TELEPHONE (OUTPATIENT)
Dept: INTERNAL MEDICINE | Age: 46
End: 2021-11-12

## 2021-11-12 NOTE — TELEPHONE ENCOUNTER
PT IS REQUESTING FOR THE ORDER FOR THE MRI OF HIS SPINE TO BE FAXED OVER TO [facility] SO THEY CAN GET IT SCHEDULED.

## 2021-12-09 ENCOUNTER — OFFICE VISIT (OUTPATIENT)
Dept: INTERNAL MEDICINE | Age: 46
End: 2021-12-09

## 2021-12-09 VITALS
TEMPERATURE: 97.3 F | HEIGHT: 73 IN | BODY MASS INDEX: 26.69 KG/M2 | SYSTOLIC BLOOD PRESSURE: 122 MMHG | HEART RATE: 70 BPM | DIASTOLIC BLOOD PRESSURE: 82 MMHG | OXYGEN SATURATION: 98 % | WEIGHT: 201.4 LBS

## 2021-12-09 DIAGNOSIS — M50.30 DDD (DEGENERATIVE DISC DISEASE), CERVICAL: Primary | ICD-10-CM

## 2021-12-09 PROCEDURE — 99213 OFFICE O/P EST LOW 20 MIN: CPT | Performed by: NURSE PRACTITIONER

## 2021-12-09 NOTE — PROGRESS NOTES
"    I N T E R N A L  M E D I C I N E  MARA JACOBO, APRN      ENCOUNTER DATE:  12/09/2021    Ever Villagran / 46 y.o. / male      CHIEF COMPLAINT / REASON FOR OFFICE VISIT     Follow-up (MRI cervical, 6 wk f/u)      ASSESSMENT & PLAN     1. DDD (degenerative disc disease), cervical  -Continue physical therapy, evaluation with chiropractor and massage  - Ambulatory Referral to Neurosurgery    Orders Placed This Encounter   Procedures   • Ambulatory Referral to Neurosurgery     No orders of the defined types were placed in this encounter.      SUMMARY/DISCUSSION  • Follow-up recommended in 6 months for annual physical with fasting labs, earlier if needed    Next Appointment with me: Visit date not found    Return in about 6 months (around 6/9/2022) for Annual physical.      VITAL SIGNS     Visit Vitals  /82 (Cuff Size: Large Adult)   Pulse 70   Temp 97.3 °F (36.3 °C) (Temporal)   Ht 185.4 cm (73\")   Wt 91.4 kg (201 lb 6.4 oz)   SpO2 98%   BMI 26.57 kg/m²     Wt Readings from Last 3 Encounters:   12/09/21 91.4 kg (201 lb 6.4 oz)   10/28/21 91.2 kg (201 lb)   09/21/21 91 kg (200 lb 9.6 oz)     Body mass index is 26.57 kg/m².      MEDICATIONS AT THE TIME OF OFFICE VISIT     Current Outpatient Medications on File Prior to Visit   Medication Sig   • esomeprazole (nexIUM) 40 MG packet Take 40 mg by mouth Every Morning Before Breakfast.     No current facility-administered medications on file prior to visit.         HISTORY OF PRESENT ILLNESS     Patient presents for further follow-up of cervical spine pain radiating to the left upper extremity.  MRI of the cervical spine showed degenerative disc disease with foraminal stenosis.  He has been presenting weekly physical therapy along with chiropractic visits and deep massage.  Experiencing weakness of his left upper extremity along with atrophy of the muscle.    REVIEW OF SYSTEMS     Constitutional neg except per HPI   Resp neg  CV neg  Musc cervical pain  Neuro left upper " extremity weakness      PHYSICAL EXAMINATION     Physical Exam  Constitutional  No distress  Cardiovascular Rate  normal . Rhythm: regular . Heart sounds:  normal  Pulmonary/Chest  Effort normal. Breath sounds:  normal  Musc left upper extremity weakness compared to right side  Neuro +2 bilateral radial pulse  Psychiatric  Alert. Judgment and thought content normal. Mood normal      REVIEWED DATA     Labs:     Scanned [facility] imaging of MRI of the cervical spine      Imaging:           Medical Tests:             Summary of old records / correspondence / consultant report:           Request outside records:           *Examiner was wearing medical surgical mask, face shield and exam gloves during the entire duration of the visit. Patient was masked the entire time.   Minimum social distance of 6 ft maintained entire visit except if physical contact was necessary as documented.     Dictated utilizing Dragon dictation

## 2022-02-23 ENCOUNTER — TELEPHONE (OUTPATIENT)
Dept: INTERNAL MEDICINE | Age: 47
End: 2022-02-23

## 2022-03-16 ENCOUNTER — TELEPHONE (OUTPATIENT)
Dept: INTERNAL MEDICINE | Age: 47
End: 2022-03-16

## 2022-03-21 ENCOUNTER — TELEPHONE (OUTPATIENT)
Dept: INTERNAL MEDICINE | Age: 47
End: 2022-03-21

## 2022-03-28 ENCOUNTER — TELEPHONE (OUTPATIENT)
Dept: INTERNAL MEDICINE | Age: 47
End: 2022-03-28

## 2022-04-11 DIAGNOSIS — D17.9 LIPOMA, UNSPECIFIED SITE: Primary | ICD-10-CM

## 2022-04-13 DIAGNOSIS — M51.36 DEGENERATIVE DISC DISEASE, LUMBAR: ICD-10-CM

## 2022-04-13 DIAGNOSIS — M50.30 DEGENERATIVE DISC DISEASE, CERVICAL: ICD-10-CM

## 2022-04-13 DIAGNOSIS — D17.79: Primary | ICD-10-CM

## 2022-04-28 RX ORDER — ESOMEPRAZOLE MAGNESIUM 40 MG/1
40 FOR SUSPENSION ORAL
Qty: 90 EACH | Refills: 3 | Status: SHIPPED | OUTPATIENT
Start: 2022-04-28

## 2022-04-28 NOTE — TELEPHONE ENCOUNTER
----- Message from Ever Villagran sent at 4/28/2022  8:56 AM EDT -----  Regarding: Prescription   Could I get more or the Esomeprazole Mag 40 cap for my stomach? I just finished with my second refill.     I have a appointment June 8th with the general surgeon for my lipoma.

## 2022-12-16 ENCOUNTER — OFFICE VISIT (OUTPATIENT)
Dept: INTERNAL MEDICINE | Age: 47
End: 2022-12-16

## 2022-12-16 VITALS
HEIGHT: 73 IN | HEART RATE: 79 BPM | BODY MASS INDEX: 26.77 KG/M2 | WEIGHT: 202 LBS | TEMPERATURE: 97.1 F | OXYGEN SATURATION: 98 % | DIASTOLIC BLOOD PRESSURE: 80 MMHG | SYSTOLIC BLOOD PRESSURE: 124 MMHG

## 2022-12-16 DIAGNOSIS — Z12.11 COLON CANCER SCREENING: ICD-10-CM

## 2022-12-16 DIAGNOSIS — J06.9 VIRAL URI WITH COUGH: Primary | ICD-10-CM

## 2022-12-16 DIAGNOSIS — R05.9 COUGH, UNSPECIFIED TYPE: ICD-10-CM

## 2022-12-16 DIAGNOSIS — R14.0 SENSATION OF GASEOUS ABDOMINAL FULLNESS: ICD-10-CM

## 2022-12-16 LAB
EXPIRATION DATE: NORMAL
FLUAV AG UPPER RESP QL IA.RAPID: NOT DETECTED
FLUBV AG UPPER RESP QL IA.RAPID: NOT DETECTED
INTERNAL CONTROL: NORMAL
Lab: NORMAL
SARS-COV-2 AG UPPER RESP QL IA.RAPID: NOT DETECTED

## 2022-12-16 PROCEDURE — 87428 SARSCOV & INF VIR A&B AG IA: CPT

## 2022-12-16 PROCEDURE — 99213 OFFICE O/P EST LOW 20 MIN: CPT

## 2022-12-16 NOTE — PROGRESS NOTES
"    I N T E R N A L  M E D I C I N E  Dot Salas, ZAINAB    ENCOUNTER DATE:  12/16/2022    Ever Villagran / 47 y.o. / male      CHIEF COMPLAINT / REASON FOR OFFICE VISIT     URI (Sx: last week covid test on 12-13-22 ), Sinusitis, Cough, and GI Problem      ASSESSMENT & PLAN     Diagnoses and all orders for this visit:    1. Viral URI with cough (Primary)    2. Cough, unspecified type  -     POCT SARS-CoV-2 Antigen KELLIE + Flu    3. Sensation of gaseous abdominal fullness  -     Ambulatory Referral to Allergy    4. Colon cancer screening  -     Ambulatory Referral For Screening Colonoscopy         SUMMARY/DISCUSSION  • Negative for COVID-19/ Flu A/B at today's appointment.  • Recommend Mucinex DM, increased hydration with water.  • Return for worsening/ non improving symptoms.  • Visit ER for any chest pain, dyspnea, oxygenation <92%.  • Pt to proceed with allergy referral to investigate possible food allergies related to pt's complaint of gas and bloating.  Agreeable to update colonoscopy.      Next Appointment with me: 1/4/2023    Return for Next scheduled follow up.      VITAL SIGNS     Visit Vitals  /80   Pulse 79   Temp 97.1 °F (36.2 °C)   Ht 185.4 cm (72.99\")   Wt 91.6 kg (202 lb)   SpO2 98%   BMI 26.66 kg/m²             Wt Readings from Last 3 Encounters:   12/16/22 91.6 kg (202 lb)   12/09/21 91.4 kg (201 lb 6.4 oz)   10/28/21 91.2 kg (201 lb)     Body mass index is 26.66 kg/m².        MEDICATIONS AT THE TIME OF OFFICE VISIT     Current Outpatient Medications on File Prior to Visit   Medication Sig Dispense Refill   • esomeprazole (nexIUM) 40 MG packet Take 40 mg by mouth Every Morning Before Breakfast. 90 each 3     No current facility-administered medications on file prior to visit.        HISTORY OF PRESENT ILLNESS     Symptoms started this past Sunday/ Monday with scratchy throat, nasal congestion, fatigue, cough.  COVID-19 test on Tuesday was negative.  Today, he is starting to feel better than " yesterday.  Using cough drops with some benefit.  Wife recently tested positive for COVID-19.  He works with the public as a massage therapist.  Denies chest pain, chest tightness, dyspnea, fever, chills, myalgias.  He is eating, drinking, urinating well.    He is very physically active, and competes in body building competitions.  He reports he watches his diet extremely carefully; however, in recent months he has developed significant gas and bloating.  A typical daily diet will include 3 cups of vegetables, 22 egg whites, 2 cups of oatmeal, 18 ounces of chicken.  He recently switched from green beans to broccoli and did not observe a change in symptoms.    Denies any abdominal pain, change in bowel habits, diarrhea, constipation, blood in stool.  His dyspepsia symptoms are well controlled on Nexium 40 mg daily.  Denies any dysphagia, early satiety.  No prior colonoscopy.      Patient Care Team:  Azam Heller APRN as PCP - General (Internal Medicine)    REVIEW OF SYSTEMS     Review of Systems   Constitutional: Positive for fatigue. Negative for chills, fever and unexpected weight change.   HENT: Positive for congestion and sore throat. Negative for ear pain, sinus pressure and sinus pain.    Respiratory: Positive for cough. Negative for chest tightness and shortness of breath.    Cardiovascular: Negative for chest pain, palpitations and leg swelling.   Gastrointestinal: Negative for abdominal pain, blood in stool, constipation, diarrhea, nausea and vomiting.   Genitourinary: Negative for decreased urine volume.   Neurological: Negative for dizziness, weakness, light-headedness and headaches.   Psychiatric/Behavioral: The patient is not nervous/anxious.           PHYSICAL EXAMINATION     Physical Exam  Vitals reviewed.   Constitutional:       General: He is not in acute distress.     Appearance: Normal appearance. He is not ill-appearing, toxic-appearing or diaphoretic.   HENT:      Head: Normocephalic and  atraumatic.      Right Ear: Tympanic membrane, ear canal and external ear normal. There is no impacted cerumen.      Left Ear: Tympanic membrane, ear canal and external ear normal. There is no impacted cerumen.      Nose: Nose normal. No congestion or rhinorrhea.      Mouth/Throat:      Mouth: Mucous membranes are moist.      Pharynx: Oropharynx is clear. No oropharyngeal exudate or posterior oropharyngeal erythema.   Eyes:      Conjunctiva/sclera: Conjunctivae normal.   Cardiovascular:      Rate and Rhythm: Normal rate and regular rhythm.      Heart sounds: Normal heart sounds.   Pulmonary:      Effort: Pulmonary effort is normal.      Breath sounds: Normal breath sounds.   Abdominal:      General: There is no distension.      Palpations: Abdomen is soft.      Tenderness: There is no abdominal tenderness.   Lymphadenopathy:      Cervical: No cervical adenopathy.   Neurological:      Mental Status: He is alert and oriented to person, place, and time. Mental status is at baseline.   Psychiatric:         Mood and Affect: Mood normal.         Behavior: Behavior normal.         Thought Content: Thought content normal.         Judgment: Judgment normal.           REVIEWED DATA     Labs:           Imaging:            Medical Tests:           Summary of old records / correspondence / consultant report:           Request outside records:

## 2022-12-22 ENCOUNTER — OFFICE VISIT (OUTPATIENT)
Dept: INTERNAL MEDICINE | Age: 47
End: 2022-12-22

## 2022-12-22 VITALS
HEART RATE: 81 BPM | TEMPERATURE: 98.6 F | BODY MASS INDEX: 26.43 KG/M2 | HEIGHT: 73 IN | SYSTOLIC BLOOD PRESSURE: 128 MMHG | DIASTOLIC BLOOD PRESSURE: 78 MMHG | WEIGHT: 199.4 LBS | OXYGEN SATURATION: 97 %

## 2022-12-22 DIAGNOSIS — K52.9 GASTROENTERITIS, ACUTE: Primary | ICD-10-CM

## 2022-12-22 LAB
EXPIRATION DATE: NORMAL
FLUAV AG NPH QL: NEGATIVE
FLUBV AG NPH QL: NEGATIVE
INTERNAL CONTROL: NORMAL
Lab: NORMAL

## 2022-12-22 PROCEDURE — 99213 OFFICE O/P EST LOW 20 MIN: CPT | Performed by: NURSE PRACTITIONER

## 2022-12-22 PROCEDURE — 87804 INFLUENZA ASSAY W/OPTIC: CPT | Performed by: NURSE PRACTITIONER

## 2022-12-22 RX ORDER — OSELTAMIVIR PHOSPHATE 75 MG/1
75 CAPSULE ORAL 2 TIMES DAILY
Qty: 10 CAPSULE | Refills: 0 | Status: SHIPPED | OUTPATIENT
Start: 2022-12-22 | End: 2022-12-27

## 2022-12-22 RX ORDER — ONDANSETRON 4 MG/1
4 TABLET, FILM COATED ORAL EVERY 8 HOURS PRN
Qty: 21 TABLET | Refills: 0 | Status: SHIPPED | OUTPATIENT
Start: 2022-12-22 | End: 2022-12-29

## 2022-12-22 NOTE — PROGRESS NOTES
I N T E R N A L  M E D I C I N E  ZAINAB Cat    ENCOUNTER DATE:  12/22/2022    Ever Villagran / 47 y.o. / male      CHIEF COMPLAINT / REASON FOR OFFICE VISIT     Fever, Chills, Generalized Body Aches, Headache, Nasal Congestion, URI, Sore Throat, Cough, Fatigue, Diarrhea, and Nausea      ASSESSMENT & PLAN     Diagnoses and all orders for this visit:    1. Gastroenteritis, acute (Primary)  -     POCT Influenza A/B    Other orders  -     oseltamivir (Tamiflu) 75 MG capsule; Take 1 capsule by mouth 2 (Two) Times a Day for 5 days.  Dispense: 10 capsule; Refill: 0  -     ondansetron (Zofran) 4 MG tablet; Take 1 tablet by mouth Every 8 (Eight) Hours As Needed for Nausea or Vomiting for up to 7 days.  Dispense: 21 tablet; Refill: 0         SUMMARY/DISCUSSION  • Patient states tested negative for COVID 19 at home yesterday and influenza. States wife recently had COVID and Influenza  • Discussion of Viral Gastroenteritis and to continue Tylenol as needed for fever and headache, Mucinex DM for cough and Congestion, ordered Tamiflu and Zofran and instructed purpose and administration. Instructed hydration with Gatorade/Pedialyte and po water.  • Instructed patient to go to the ER for acute abdominal pain, passage of bright red blood in stool, fever >101.   • Follow up with ZAINAB Allan as scheduled and as needed  • I spent 30 min in direct care of this patient on this date of service. This time includes times spent by me in the following activities: Preparing for the visit, obtaining and/or reviewing a separately obtained history, performing a medically appropriate examination and/or evaluation, reviewing medical records, reviewing tests, ordering medications, tests, or procedures, counseling and educating the patient, documenting information in the medical record and reviewing office note/correspondence from other providers.       Next Appointment with me: Visit date not found    Return in about 13 days (around  "1/4/2023) for Next scheduled follow up.      VITAL SIGNS     Visit Vitals  /78 (BP Location: Right arm, Patient Position: Sitting, Cuff Size: Large Adult)   Pulse 81   Temp 98.6 °F (37 °C) (Temporal)   Ht 185.4 cm (72.99\")   Wt 90.4 kg (199 lb 6.4 oz)   SpO2 97%   BMI 26.31 kg/m²           BP Readings from Last 3 Encounters:   12/22/22 128/78   12/16/22 124/80   12/09/21 122/82     Wt Readings from Last 3 Encounters:   12/22/22 90.4 kg (199 lb 6.4 oz)   12/16/22 91.6 kg (202 lb)   12/09/21 91.4 kg (201 lb 6.4 oz)     Body mass index is 26.31 kg/m².    Blood pressure readings recorded on patient flowsheet:  No flowsheet data found.          MEDICATIONS AT THE TIME OF OFFICE VISIT     Current Outpatient Medications on File Prior to Visit   Medication Sig Dispense Refill   • esomeprazole (nexIUM) 40 MG packet Take 40 mg by mouth Every Morning Before Breakfast. 90 each 3     No current facility-administered medications on file prior to visit.        HISTORY OF PRESENT ILLNESS     47 year old male being seen today for complaints of Fever, Chills, Generalized Body Aches, Headache, Nasal Congestion, URI, Sore Throat, Cough, Fatigue, Diarrhea, and Nausea for about 2 weeks. States tested negative at home for COVID-19 and again in our office on 12/16/22. States his spouse has had both the flu and COVID at home. States no energy, and increased fatigue. States has been taking his Nexium and having dark stools. Patient states scheduled for a Colonoscopy.       Patient Care Team:  Azam Heller APRN as PCP - General (Internal Medicine)    REVIEW OF SYSTEMS     Review of Systems   Constitutional: Positive for chills, fatigue and fever.   HENT: Positive for congestion and sore throat.    Respiratory: Positive for cough.    Gastrointestinal: Positive for abdominal pain, diarrhea and nausea. Negative for blood in stool and vomiting.   Musculoskeletal: Positive for myalgias.   Neurological: Positive for headaches.    "       PHYSICAL EXAMINATION     Physical Exam  Constitutional:       Appearance: He is ill-appearing.   HENT:      Nose: Nose normal.      Mouth/Throat:      Mouth: Mucous membranes are moist.      Pharynx: Oropharynx is clear. No oropharyngeal exudate or posterior oropharyngeal erythema.   Cardiovascular:      Rate and Rhythm: Normal rate and regular rhythm.      Pulses: Normal pulses.      Heart sounds: Normal heart sounds.   Pulmonary:      Effort: Pulmonary effort is normal.      Breath sounds: Normal breath sounds.   Abdominal:      General: Bowel sounds are normal. There is no distension.      Palpations: Abdomen is soft.      Tenderness: There is no abdominal tenderness.   Musculoskeletal:         General: Normal range of motion.   Lymphadenopathy:      Cervical: Cervical adenopathy present.   Skin:     General: Skin is warm and dry.   Neurological:      Mental Status: He is alert. Mental status is at baseline.             REVIEWED DATA     Labs:     Lab Results   Component Value Date     10/28/2021    K 5.0 10/28/2021    CALCIUM 9.3 10/28/2021    AST 37 10/28/2021    ALT 33 10/28/2021    BUN 23 10/28/2021    CREATININE 1.22 10/28/2021    CREATININE 1.35 (H) 03/26/2021    EGFRIFNONA 71 10/28/2021    EGFRIFAFRI 82 10/28/2021       Lab Results   Component Value Date    HGBA1C 5.40 03/26/2021       Lab Results   Component Value Date     (H) 03/26/2021    HDL 27 (L) 03/26/2021    TRIG 101 03/26/2021       Lab Results   Component Value Date    TSH 1.450 03/26/2021    FREET4 1.21 03/26/2021       Lab Results   Component Value Date    WBC 6.51 09/21/2021    HGB 16.9 09/21/2021     09/21/2021       No results found for: MALBCRERATIO       Imaging:           Medical Tests:           Summary of old records / correspondence / consultant report:           Request outside records:           ZAINAB Cat

## 2023-01-04 ENCOUNTER — OFFICE VISIT (OUTPATIENT)
Dept: INTERNAL MEDICINE | Age: 48
End: 2023-01-04
Payer: COMMERCIAL

## 2023-01-04 VITALS
HEART RATE: 76 BPM | DIASTOLIC BLOOD PRESSURE: 80 MMHG | WEIGHT: 197 LBS | HEIGHT: 73 IN | SYSTOLIC BLOOD PRESSURE: 120 MMHG | OXYGEN SATURATION: 95 % | BODY MASS INDEX: 26.11 KG/M2 | TEMPERATURE: 97.7 F

## 2023-01-04 DIAGNOSIS — Z00.00 ANNUAL PHYSICAL EXAM: Primary | ICD-10-CM

## 2023-01-04 DIAGNOSIS — Z12.5 PROSTATE CANCER SCREENING: ICD-10-CM

## 2023-01-04 PROCEDURE — 99396 PREV VISIT EST AGE 40-64: CPT

## 2023-01-04 NOTE — PROGRESS NOTES
I N T E R N A L  M E D I C I N E  Dot Salas, APRN      ENCOUNTER DATE:  01/04/2023    Ever Villagran / 47 y.o. / male    CHIEF COMPLAINT     Annual Exam    Feels good today.  Does report minor, continued congestion and cough following recent URI, but that is improving.  He has returned to work and exercises regularly.      Does have allergy appointment scheduled next week to investigate possible food allergies.    Remains on TRT, prescribed Dr. Alonzo.    Plans to schedule colonoscopy soon.  Denies abdominal pain, bowel changes, blood in stool or dark, tarry stools.    Defers ELLIE today.    VITALS     Visit Vitals  /80   Pulse 76   Temp 97.7 °F (36.5 °C)   Ht 185.4 cm (72.99\")   Wt 89.4 kg (197 lb)   SpO2 95%   BMI 26.00 kg/m²       BP Readings from Last 3 Encounters:   01/04/23 120/80   12/22/22 128/78   12/16/22 124/80     Wt Readings from Last 3 Encounters:   01/04/23 89.4 kg (197 lb)   12/22/22 90.4 kg (199 lb 6.4 oz)   12/16/22 91.6 kg (202 lb)      Body mass index is 26 kg/m².      MEDICATIONS     Current Outpatient Medications on File Prior to Visit   Medication Sig Dispense Refill   • esomeprazole (nexIUM) 40 MG packet Take 40 mg by mouth Every Morning Before Breakfast. 90 each 3     No current facility-administered medications on file prior to visit.         HISTORY OF PRESENT ILLNESS      Ever presents for annual health maintenance visit.    · General health: excellent  · Lifestyle:  · Attempting to lose weight?: Yes   · Diet: eats a well balanced, healthy diet  · Exercise: exercises nearly every day  · Tobacco: Never used   · Alcohol: does not drink  · Work: Full-time  · Reproductive health:  · Sexually active?: Yes   · Concern for STD?: No   · Sexual problems?: No problems   · Sees Urologist?: No   · Depression Screening:      PHQ-2/PHQ-9 Depression Screening 12/16/2022   Retired Total Score -   Little Interest or Pleasure in Doing Things 0-->not at all   Feeling Down, Depressed or Hopeless  0-->not at all   PHQ-9: Brief Depression Severity Measure Score 0         PHQ-2: 0 (Not depressed)     PHQ-9: 0 (Negative screening for depression)    Patient Care Team:  Azam Heller APRN as PCP - General (Internal Medicine)  ______________________________________________________________________    ALLERGIES  No Known Allergies     PFSH:     The following portions of the patient's history were reviewed and updated as appropriate: Allergies / Current Medications / Past Medical History / Surgical History / Social History / Family History    PROBLEM LIST   Patient Active Problem List   Diagnosis   • Elevated liver enzymes   • Renal cyst   • Gastroenteritis, acute       PAST MEDICAL HISTORY  Past Medical History:   Diagnosis Date   • COVID-19    • GERD (gastroesophageal reflux disease)    • Retrolisthesis of vertebrae        SURGICAL HISTORY  Past Surgical History:   Procedure Laterality Date   • TONSILLECTOMY         SOCIAL HISTORY  Social History     Socioeconomic History   • Marital status:    Tobacco Use   • Smoking status: Never   • Smokeless tobacco: Never   Vaping Use   • Vaping Use: Never used   Substance and Sexual Activity   • Alcohol use: Never     Comment: none 11 years    • Drug use: Never   • Sexual activity: Yes     Partners: Female     Birth control/protection: None       FAMILY HISTORY  Family History   Problem Relation Age of Onset   • Heart disease Mother    • Heart attack Mother    • Emphysema Father    • COPD Father    • Multiple sclerosis Sister    • Fibromyalgia Sister    • Heart attack Maternal Grandmother    • Heart attack Maternal Grandfather    • Emphysema Paternal Grandfather        IMMUNIZATION HISTORY  Immunization History   Administered Date(s) Administered   • COVID-19 (PFIZER) PURPLE CAP 04/05/2021, 04/26/2021   • TD Preservative Free 11/25/2022         REVIEW OF SYSTEMS     Review of Systems   Constitutional: Negative for chills, fever and unexpected weight change.   HENT:  Positive for congestion.    Respiratory: Positive for cough. Negative for chest tightness and shortness of breath.    Cardiovascular: Negative for chest pain, palpitations and leg swelling.   Neurological: Negative for dizziness, weakness, light-headedness and headaches.   Psychiatric/Behavioral: The patient is not nervous/anxious.        PHYSICAL EXAMINATION     Physical Exam  Vitals reviewed.   Constitutional:       General: He is not in acute distress.     Appearance: Normal appearance. He is not ill-appearing, toxic-appearing or diaphoretic.   HENT:      Head: Normocephalic and atraumatic.      Right Ear: Tympanic membrane, ear canal and external ear normal. There is no impacted cerumen.      Left Ear: Tympanic membrane, ear canal and external ear normal. There is no impacted cerumen.      Nose: Nose normal. No congestion or rhinorrhea.      Mouth/Throat:      Mouth: Mucous membranes are moist.      Pharynx: Oropharynx is clear. No oropharyngeal exudate or posterior oropharyngeal erythema.   Eyes:      Extraocular Movements: Extraocular movements intact.      Conjunctiva/sclera: Conjunctivae normal.      Pupils: Pupils are equal, round, and reactive to light.   Cardiovascular:      Rate and Rhythm: Normal rate and regular rhythm.      Heart sounds: Normal heart sounds.   Pulmonary:      Effort: Pulmonary effort is normal. No respiratory distress.      Breath sounds: Normal breath sounds.   Abdominal:      General: Bowel sounds are normal.      Palpations: Abdomen is soft.      Tenderness: There is no abdominal tenderness.   Musculoskeletal:         General: Normal range of motion.      Cervical back: Normal range of motion and neck supple.      Right lower leg: No edema.      Left lower leg: No edema.   Lymphadenopathy:      Cervical: No cervical adenopathy.   Skin:     General: Skin is warm and dry.   Neurological:      General: No focal deficit present.      Mental Status: He is alert and oriented to person,  place, and time. Mental status is at baseline.   Psychiatric:         Mood and Affect: Mood normal.         Behavior: Behavior normal.         Thought Content: Thought content normal.         Judgment: Judgment normal.         REVIEWED DATA      Labs:    Lab Results   Component Value Date     10/28/2021    K 5.0 10/28/2021    CALCIUM 9.3 10/28/2021    AST 37 10/28/2021    ALT 33 10/28/2021    BUN 23 10/28/2021    CREATININE 1.22 10/28/2021    CREATININE 1.35 (H) 03/26/2021    EGFRIFNONA 71 10/28/2021    EGFRIFAFRI 82 10/28/2021       Lab Results   Component Value Date    GLUCOSE 71 10/28/2021    HGBA1C 5.40 03/26/2021    TSH 1.450 03/26/2021    FREET4 1.21 03/26/2021       Lab Results   Component Value Date    PSA 0.432 03/26/2021       [unfilled]    Lab Results   Component Value Date     (H) 03/26/2021    HDL 27 (L) 03/26/2021    TRIG 101 03/26/2021    CHOLHDLRATIO 6.26 03/26/2021       No components found for: [id number]    Lab Results   Component Value Date    WBC 6.51 09/21/2021    HGB 16.9 09/21/2021    MCV 88.6 09/21/2021     09/21/2021       No results found for: PROTEIN, GLUCOSEU, BLOODU, NITRITEU, LEUKOCYTESUR       Lab Results   Component Value Date    HEPCVIRUSABY <0.1 03/26/2021       Imaging:           Medical Tests:           ASSESSMENT & PLAN     ANNUAL WELLNESS EXAM / PHYSICAL     Diagnoses and all orders for this visit:    1. Annual physical exam (Primary)  -     CBC & Differential  -     Comprehensive Metabolic Panel  -     Hemoglobin A1c  -     Lipid Panel With / Chol / HDL Ratio  -     TSH+Free T4  -     Urinalysis without microscopic (no culture) - Urine, Clean Catch    2. Prostate cancer screening  -     PSA Screen         Summary/Discussion:     · Agreeable to update labs.    Next Appointment with me: Visit date not found    Return in about 1 year (around 1/4/2024) for Annual physical.      HEALTHCARE MAINTENANCE ISSUES       Cancer Screening:  · Colon: Initial/Next screening at  age: DUE NOW  · Repeat colon cancer screening: N/A at this time  · Prostate: PSA checked annually but defer ELLIE until 50  · Testicular: Recommended monthly self exam  · Skin: Monthly self skin examination, annual exam by health professional  · Lung: Does not meet criteria for lung cancer screening.   · Other:    Screening Labs & Tests:  · Lab results reviewed & discussed with with patient or orders placed today.  · EKG:  · CV Screening: Lipid panel  · DEXA (75+ or risk factors):   · HEP C (If born [id number] or risk factors): Previously had negative screen  · Other:     Immunization/Vaccinations (to be given today unless deferred by patient)  · Influenza: Recommended annual influenza vaccine  · Hepatitis A: Verify immunization records  · Hepatitis B: Verify immunization records  · Tetanus/Pertussis: Up to date  · Pneumovax/PCV: Not needed at this time  · Shingles: Not needed at this time  · COVID: Recommended the bivalent booster shot  Lifestyle Counseling:  · Lifestyle Modifications: Continue good lifestyle choices/modifications and Reduce exposure to stress if possible  · Safety Issues: Always wear seatbelt, Avoid texting while driving   · Use sunscreen, regular skin examination  · Recommended annual dental/vision examination.  · Emotional/Stress/Sleep: Reviewed and  given when appropriate      Health Maintenance   Topic Date Due   • COLORECTAL CANCER SCREENING  Never done   • COVID-19 Vaccine (3 - Booster for Pfizer series) 01/06/2023 (Originally 6/21/2021)   • INFLUENZA VACCINE  03/31/2023 (Originally 8/1/2022)   • ANNUAL PHYSICAL  01/04/2024   • TDAP/TD VACCINES (2 - Tdap) 11/25/2032   • HEPATITIS C SCREENING  Completed   • Pneumococcal Vaccine 0-64  Aged Out

## 2023-01-05 LAB
ALBUMIN SERPL-MCNC: 4 G/DL (ref 3.5–5.2)
ALBUMIN/GLOB SERPL: 1.9 G/DL
ALP SERPL-CCNC: 47 U/L (ref 39–117)
ALT SERPL-CCNC: 57 U/L (ref 1–41)
APPEARANCE UR: CLEAR
AST SERPL-CCNC: 52 U/L (ref 1–40)
BASOPHILS # BLD AUTO: 0.03 10*3/MM3 (ref 0–0.2)
BASOPHILS NFR BLD AUTO: 0.4 % (ref 0–1.5)
BILIRUB SERPL-MCNC: 0.3 MG/DL (ref 0–1.2)
BILIRUB UR QL STRIP: NEGATIVE
BUN SERPL-MCNC: 28 MG/DL (ref 6–20)
BUN/CREAT SERPL: 21.1 (ref 7–25)
CALCIUM SERPL-MCNC: 9.2 MG/DL (ref 8.6–10.5)
CHLORIDE SERPL-SCNC: 100 MMOL/L (ref 98–107)
CHOLEST SERPL-MCNC: 159 MG/DL (ref 0–200)
CHOLEST/HDLC SERPL: 5.68 {RATIO}
CO2 SERPL-SCNC: 29.4 MMOL/L (ref 22–29)
COLOR UR: YELLOW
CREAT SERPL-MCNC: 1.33 MG/DL (ref 0.76–1.27)
EGFRCR SERPLBLD CKD-EPI 2021: 66.8 ML/MIN/1.73
EOSINOPHIL # BLD AUTO: 0.06 10*3/MM3 (ref 0–0.4)
EOSINOPHIL NFR BLD AUTO: 0.8 % (ref 0.3–6.2)
ERYTHROCYTE [DISTWIDTH] IN BLOOD BY AUTOMATED COUNT: 12 % (ref 12.3–15.4)
GLOBULIN SER CALC-MCNC: 2.1 GM/DL
GLUCOSE SERPL-MCNC: 87 MG/DL (ref 65–99)
GLUCOSE UR QL STRIP: NEGATIVE
HBA1C MFR BLD: 5.3 % (ref 4.8–5.6)
HCT VFR BLD AUTO: 50 % (ref 37.5–51)
HDLC SERPL-MCNC: 28 MG/DL (ref 40–60)
HGB BLD-MCNC: 17.1 G/DL (ref 13–17.7)
HGB UR QL STRIP: NEGATIVE
IMM GRANULOCYTES # BLD AUTO: 0.03 10*3/MM3 (ref 0–0.05)
IMM GRANULOCYTES NFR BLD AUTO: 0.4 % (ref 0–0.5)
KETONES UR QL STRIP: NEGATIVE
LDLC SERPL CALC-MCNC: 109 MG/DL (ref 0–100)
LEUKOCYTE ESTERASE UR QL STRIP: NEGATIVE
LYMPHOCYTES # BLD AUTO: 1.64 10*3/MM3 (ref 0.7–3.1)
LYMPHOCYTES NFR BLD AUTO: 21 % (ref 19.6–45.3)
MCH RBC QN AUTO: 31.8 PG (ref 26.6–33)
MCHC RBC AUTO-ENTMCNC: 34.2 G/DL (ref 31.5–35.7)
MCV RBC AUTO: 93.1 FL (ref 79–97)
MONOCYTES # BLD AUTO: 0.59 10*3/MM3 (ref 0.1–0.9)
MONOCYTES NFR BLD AUTO: 7.6 % (ref 5–12)
NEUTROPHILS # BLD AUTO: 5.45 10*3/MM3 (ref 1.7–7)
NEUTROPHILS NFR BLD AUTO: 69.8 % (ref 42.7–76)
NITRITE UR QL STRIP: NEGATIVE
NRBC BLD AUTO-RTO: 0 /100 WBC (ref 0–0.2)
PH UR STRIP: 6 [PH] (ref 5–8)
PLATELET # BLD AUTO: 200 10*3/MM3 (ref 140–450)
POTASSIUM SERPL-SCNC: 4.6 MMOL/L (ref 3.5–5.2)
PROT SERPL-MCNC: 6.1 G/DL (ref 6–8.5)
PROT UR QL STRIP: NEGATIVE
PSA SERPL-MCNC: 0.47 NG/ML (ref 0–4)
RBC # BLD AUTO: 5.37 10*6/MM3 (ref 4.14–5.8)
SODIUM SERPL-SCNC: 138 MMOL/L (ref 136–145)
SP GR UR STRIP: 1.02 (ref 1–1.03)
T4 FREE SERPL-MCNC: 1.27 NG/DL (ref 0.93–1.7)
TRIGL SERPL-MCNC: 117 MG/DL (ref 0–150)
TSH SERPL DL<=0.005 MIU/L-ACNC: 1.64 UIU/ML (ref 0.27–4.2)
UROBILINOGEN UR STRIP-MCNC: NORMAL MG/DL
VLDLC SERPL CALC-MCNC: 22 MG/DL (ref 5–40)
WBC # BLD AUTO: 7.8 10*3/MM3 (ref 3.4–10.8)

## 2023-03-08 DIAGNOSIS — H93.19 TINNITUS, UNSPECIFIED LATERALITY: Primary | ICD-10-CM

## 2023-03-20 DIAGNOSIS — F41.9 ANXIETY: Primary | ICD-10-CM

## 2023-03-20 RX ORDER — BUSPIRONE HYDROCHLORIDE 10 MG/1
10 TABLET ORAL 3 TIMES DAILY
Qty: 90 TABLET | Refills: 1 | Status: SHIPPED | OUTPATIENT
Start: 2023-03-20

## 2023-04-23 DIAGNOSIS — R74.8 ELEVATED LIVER ENZYMES: ICD-10-CM

## 2023-04-23 DIAGNOSIS — E78.6 HDL DEFICIENCY: Primary | ICD-10-CM

## 2023-04-24 DIAGNOSIS — N28.1 RENAL CYST: ICD-10-CM

## 2023-04-24 DIAGNOSIS — R79.89 ELEVATED SERUM CREATININE: Primary | ICD-10-CM

## 2023-04-28 RX ORDER — ESOMEPRAZOLE MAGNESIUM 40 MG/1
CAPSULE, DELAYED RELEASE ORAL
Qty: 90 CAPSULE | Refills: 3 | Status: SHIPPED | OUTPATIENT
Start: 2023-04-28 | End: 2023-04-30 | Stop reason: SDUPTHER

## 2023-04-30 RX ORDER — ESOMEPRAZOLE MAGNESIUM 40 MG/1
40 CAPSULE, DELAYED RELEASE ORAL
Qty: 90 CAPSULE | Refills: 3 | Status: SHIPPED | OUTPATIENT
Start: 2023-04-30

## 2023-05-04 ENCOUNTER — HOSPITAL ENCOUNTER (OUTPATIENT)
Dept: ULTRASOUND IMAGING | Facility: HOSPITAL | Age: 48
Discharge: HOME | End: 2023-05-04
Payer: COMMERCIAL

## 2023-05-04 DIAGNOSIS — R79.89 ELEVATED SERUM CREATININE: ICD-10-CM

## 2023-05-04 DIAGNOSIS — N28.1 RENAL CYST: ICD-10-CM

## 2023-05-04 DIAGNOSIS — G47.00 INSOMNIA, UNSPECIFIED TYPE: Primary | ICD-10-CM

## 2023-05-04 PROCEDURE — 76775 US EXAM ABDO BACK WALL LIM: CPT

## 2023-05-04 RX ORDER — TRAZODONE HYDROCHLORIDE 50 MG/1
50 TABLET ORAL NIGHTLY
Qty: 30 TABLET | Refills: 2 | Status: SHIPPED | OUTPATIENT
Start: 2023-05-04

## 2023-05-07 DIAGNOSIS — N28.89 BILATERAL RENAL MASSES: Primary | ICD-10-CM

## 2023-05-11 ENCOUNTER — OFFICE VISIT (OUTPATIENT)
Dept: INTERNAL MEDICINE | Age: 48
End: 2023-05-11
Payer: COMMERCIAL

## 2023-05-11 VITALS
BODY MASS INDEX: 25.85 KG/M2 | TEMPERATURE: 97.1 F | SYSTOLIC BLOOD PRESSURE: 122 MMHG | OXYGEN SATURATION: 96 % | HEIGHT: 74 IN | HEART RATE: 70 BPM | DIASTOLIC BLOOD PRESSURE: 74 MMHG | WEIGHT: 201.4 LBS

## 2023-05-11 DIAGNOSIS — J40 BRONCHITIS: Primary | ICD-10-CM

## 2023-05-11 PROCEDURE — 99213 OFFICE O/P EST LOW 20 MIN: CPT | Performed by: NURSE PRACTITIONER

## 2023-05-11 RX ORDER — AZITHROMYCIN 250 MG/1
TABLET, FILM COATED ORAL
Qty: 6 TABLET | Refills: 0 | Status: SHIPPED | OUTPATIENT
Start: 2023-05-11

## 2023-05-11 RX ORDER — METHYLPREDNISOLONE 4 MG/1
TABLET ORAL
Qty: 21 TABLET | Refills: 0 | Status: SHIPPED | OUTPATIENT
Start: 2023-05-11

## 2023-05-11 NOTE — PROGRESS NOTES
"    I N T E R N A L  M E D I C I N E  MARA JACOBO, APRN      ENCOUNTER DATE:  05/11/2023    Ever Villagran / 48 y.o. / male      CHIEF COMPLAINT / REASON FOR OFFICE VISIT     Cough (Productive; gray in color, x1 wk) and URI      ASSESSMENT & PLAN     Problem List Items Addressed This Visit    None  Visit Diagnoses     Bronchitis    -  Primary    Relevant Medications    azithromycin (Zithromax Z-Wing) 250 MG tablet    methylPREDNISolone (MEDROL) 4 MG dose pack        No orders of the defined types were placed in this encounter.    New Medications Ordered This Visit   Medications   • azithromycin (Zithromax Z-Wing) 250 MG tablet     Sig: Take 2 tablets by mouth on day 1, then 1 tablet daily on days 2-5     Dispense:  6 tablet     Refill:  0   • methylPREDNISolone (MEDROL) 4 MG dose pack     Sig: Take as directed on package instructions.     Dispense:  21 tablet     Refill:  0       SUMMARY/DISCUSSION  • May take OTC Mucinex or Mucinex DM as needed     Next Appointment with me: Visit date not found    No follow-ups on file.      VITAL SIGNS     Visit Vitals  /74 (Cuff Size: Large Adult)   Pulse 70   Temp 97.1 °F (36.2 °C) (Temporal)   Ht 188 cm (74\")   Wt 91.4 kg (201 lb 6.4 oz)   SpO2 96%   BMI 25.86 kg/m²       Wt Readings from Last 3 Encounters:   05/11/23 91.4 kg (201 lb 6.4 oz)   03/18/23 90.7 kg (200 lb)   01/04/23 89.4 kg (197 lb)     Body mass index is 25.86 kg/m².      MEDICATIONS AT THE TIME OF OFFICE VISIT     Current Outpatient Medications on File Prior to Visit   Medication Sig   • busPIRone (BUSPAR) 10 MG tablet Take 1 tablet by mouth 3 (Three) Times a Day.   • esomeprazole (nexIUM) 40 MG capsule Take 1 capsule by mouth Every Morning Before Breakfast.   • traZODone (DESYREL) 50 MG tablet Take 1 tablet by mouth Every Night.     No current facility-administered medications on file prior to visit.        HISTORY OF PRESENT ILLNESS     Over the course the last week patient has had increasing cough " congestion and wheezing especially at nighttime intermittently.  No improvement with over-the-counter regimens.  No fever or chills.    REVIEW OF SYSTEMS     Constitutional neg except per HPI   Resp nightly wheezing, cough, congestion   CV neg    PHYSICAL EXAMINATION     Physical Exam  Constitutional  No distress  Cardiovascular Rate  normal . Rhythm: regular . Heart sounds:  normal  Pulmonary/Chest  Effort normal. Breath sounds:  normal; cough with congestion   Psychiatric  Alert. Judgment and thought content normal. Mood normal     REVIEWED DATA     Labs:   Lab Results   Component Value Date    GLUCOSE 87 01/04/2023    BUN 28 (H) 01/04/2023    CREATININE 1.33 (H) 01/04/2023    EGFRRESULT 66.8 01/04/2023    BCR 21.1 01/04/2023    K 4.6 01/04/2023    CO2 29.4 (H) 01/04/2023    CALCIUM 9.2 01/04/2023    PROTENTOTREF 6.1 01/04/2023    ALBUMIN 4.0 01/04/2023    BILITOT 0.3 01/04/2023    AST 52 (H) 01/04/2023    ALT 57 (H) 01/04/2023     Lab Results   Component Value Date    CHLPL 159 01/04/2023    TRIG 117 01/04/2023    HDL 28 (L) 01/04/2023     (H) 01/04/2023       Imaging:           Medical Tests:           Summary of old records / correspondence / consultant report:           Request outside records:           **Dragon dictation used for documentation.

## 2023-05-25 DIAGNOSIS — R06.02 SHORTNESS OF BREATH: Primary | ICD-10-CM

## 2023-05-26 ENCOUNTER — HOSPITAL ENCOUNTER (OUTPATIENT)
Dept: GENERAL RADIOLOGY | Facility: HOSPITAL | Age: 48
Discharge: HOME | End: 2023-05-26
Payer: COMMERCIAL

## 2023-05-26 DIAGNOSIS — R06.02 SHORTNESS OF BREATH: ICD-10-CM

## 2023-05-26 DIAGNOSIS — R06.02 SHORTNESS OF BREATH: Primary | ICD-10-CM

## 2023-05-26 PROCEDURE — 71046 X-RAY EXAM CHEST 2 VIEWS: CPT

## 2023-05-26 RX ORDER — ALBUTEROL SULFATE 90 UG/1
2 AEROSOL, METERED RESPIRATORY (INHALATION) EVERY 4 HOURS PRN
Qty: 6.7 G | Refills: 1 | Status: SHIPPED | OUTPATIENT
Start: 2023-05-26

## 2023-06-15 DIAGNOSIS — G47.00 INSOMNIA, UNSPECIFIED TYPE: ICD-10-CM

## 2023-06-16 RX ORDER — TRAZODONE HYDROCHLORIDE 50 MG/1
TABLET ORAL
Qty: 30 TABLET | Refills: 2 | Status: SHIPPED | OUTPATIENT
Start: 2023-06-16

## 2023-08-08 DIAGNOSIS — G47.00 INSOMNIA, UNSPECIFIED TYPE: ICD-10-CM

## 2023-08-08 RX ORDER — TRAZODONE HYDROCHLORIDE 50 MG/1
50 TABLET ORAL NIGHTLY
Qty: 30 TABLET | Refills: 2 | Status: SHIPPED | OUTPATIENT
Start: 2023-08-08

## 2023-08-08 NOTE — TELEPHONE ENCOUNTER
----- Message from Ever Villagran sent at 8/7/2023  4:47 PM EDT -----  Regarding: Trazadone   Contact: [phone]  Was wondering if we could refill the trazadone? Also did you move to the new location on [facility]?

## 2023-08-23 DIAGNOSIS — G47.00 INSOMNIA, UNSPECIFIED TYPE: ICD-10-CM

## 2023-08-24 RX ORDER — TRAZODONE HYDROCHLORIDE 50 MG/1
50 TABLET ORAL NIGHTLY
Qty: 90 TABLET | Refills: 1 | Status: SHIPPED | OUTPATIENT
Start: 2023-08-24

## 2023-11-29 DIAGNOSIS — G47.00 INSOMNIA, UNSPECIFIED TYPE: ICD-10-CM

## 2023-11-29 RX ORDER — TRAZODONE HYDROCHLORIDE 50 MG/1
50 TABLET ORAL NIGHTLY
Qty: 90 TABLET | Refills: 1 | Status: SHIPPED | OUTPATIENT
Start: 2023-11-29

## 2024-01-11 ENCOUNTER — OFFICE VISIT (OUTPATIENT)
Dept: INTERNAL MEDICINE | Age: 49
End: 2024-01-11
Payer: COMMERCIAL

## 2024-01-11 ENCOUNTER — HOSPITAL ENCOUNTER (OUTPATIENT)
Facility: HOSPITAL | Age: 49
Discharge: HOME | End: 2024-01-11
Payer: COMMERCIAL

## 2024-01-11 VITALS
OXYGEN SATURATION: 98 % | HEIGHT: 74 IN | BODY MASS INDEX: 26.05 KG/M2 | WEIGHT: 203 LBS | HEART RATE: 78 BPM | DIASTOLIC BLOOD PRESSURE: 76 MMHG | TEMPERATURE: 98.7 F | SYSTOLIC BLOOD PRESSURE: 138 MMHG

## 2024-01-11 DIAGNOSIS — R05.3 CHRONIC COUGH: Primary | ICD-10-CM

## 2024-01-11 DIAGNOSIS — R03.0 ELEVATED BP WITHOUT DIAGNOSIS OF HYPERTENSION: ICD-10-CM

## 2024-01-11 PROCEDURE — 71046 X-RAY EXAM CHEST 2 VIEWS: CPT

## 2024-01-11 PROCEDURE — 99214 OFFICE O/P EST MOD 30 MIN: CPT

## 2024-01-11 RX ORDER — BUDESONIDE AND FORMOTEROL FUMARATE DIHYDRATE 80; 4.5 UG/1; UG/1
2 AEROSOL RESPIRATORY (INHALATION)
Qty: 10.2 G | Refills: 0 | Status: SHIPPED | OUTPATIENT
Start: 2024-01-11 | End: 2024-01-12 | Stop reason: SDUPTHER

## 2024-01-11 NOTE — PROGRESS NOTES
"    I N T E R N A L  M E D I C I N E  Dot Salas, ZAINAB    ENCOUNTER DATE:  01/11/2024    Ever Villagran / 48 y.o. / male      CHIEF COMPLAINT / REASON FOR OFFICE VISIT     URI      ASSESSMENT & PLAN     Diagnoses and all orders for this visit:    1. Chronic cough (Primary)  -     XR Chest 2 View  -     budesonide-formoterol (Symbicort) 80-4.5 MCG/ACT inhaler; Inhale 2 puffs 2 (Two) Times a Day.  Dispense: 10.2 g; Refill: 0    2. Elevated BP without diagnosis of hypertension         SUMMARY/DISCUSSION  History is most consistent with lingering bronchitis symptoms.  Stable vital signs and he is not ill appearing.  Will update Chest XR.  Start Mucinex DM, ensure good hydration with water.  Start daily Symbicort.  May continue to use albuterol inhaler as needed.  He was encouraged to monitor his BP at home and send us readings via 1SDK[employer] in 1-2 weeks for review.  Return to our office for non improving symptoms.  Consider PFT/ CT Chest.  He is aware to visit ER for acutely worsening symptoms.        Next Appointment with me: Visit date not found    Return for Next scheduled follow up.      VITAL SIGNS     Visit Vitals  /76   Pulse 78   Temp 98.7 °F (37.1 °C)   Ht 188 cm (74.02\")   Wt 92.1 kg (203 lb)   SpO2 98%   BMI 26.05 kg/m²             Wt Readings from Last 3 Encounters:   01/11/24 92.1 kg (203 lb)   01/08/24 89.4 kg (197 lb)   12/08/23 89.4 kg (197 lb)     Body mass index is 26.05 kg/m².        MEDICATIONS AT THE TIME OF OFFICE VISIT     Current Outpatient Medications on File Prior to Visit   Medication Sig Dispense Refill    albuterol sulfate  (90 Base) MCG/ACT inhaler INHALE 2 PUFFS BY MOUTH EVERY 4 HOURS FOR WHEEZING 6.7 g 1    busPIRone (BUSPAR) 10 MG tablet Take 1 tablet by mouth 3 (Three) Times a Day. 270 tablet 1    esomeprazole (nexIUM) 40 MG capsule Take 1 capsule by mouth Every Morning Before Breakfast. 90 capsule 3    traZODone (DESYREL) 50 MG tablet Take 1 tablet by mouth Every Night. 90 " tablet 1    [DISCONTINUED] Hydrocod Alexsander-Chlorphe Alexsander ER (TUSSIONEX PENNKINETIC) 10-8 MG/5ML ER suspension Take 5 mL by mouth Every 12 (Twelve) Hours As Needed for Cough. 75 mL 0    [DISCONTINUED] levoFLOXacin (LEVAQUIN) 500 MG tablet Take 1 tablet by mouth Daily. 7 tablet 0    [DISCONTINUED] predniSONE (DELTASONE) 20 MG tablet Take 1 tab TID x 4 days, 1 tab BID x 4 days, 1 tab QD x 2 days. 22 tablet 0    [DISCONTINUED] promethazine-dextromethorphan (PROMETHAZINE-DM) 6.25-15 MG/5ML syrup Take 5 mL by mouth 4 (Four) Times a Day As Needed for Cough. 118 mL 0     No current facility-administered medications on file prior to visit.        HISTORY OF PRESENT ILLNESS     Recently seen at  on January 8, 2023 and diagnosed with hypertensive urgency with BP of 191/91 and referred to ER for further evaluation - he did not go.  He sought care at the ER for acute shortness of breath since that morning, along with cough and chest tightness.  He attributed his hypertensive reading to anxiety and using his albuterol inhaler shortly before visiting the .  No prior history of HTN.  He is monitoring his BP at home, and it is now averaging 130s/80s.      First seen at  on December 1, 2023 for cough and congestion x 4 days.  Negative for COVID-19/ Flu.  Treated with solu medrol IM. He was then seen at  on December 3, 2023 for productive cough, congestion; diagnosed with bronchitis and given doxycyline and promethazine-DM.  Seen again at  on December 8, 2023 and again diagnosed with bronchitis.  Chest XR at that time showed no focal consolidation.  Treated with Levaquin and Prednisone taper.  No prior Chest XR.    At today's visit, he reports continued productive cough with clear sputum, chest soreness related to bouts of coughing, and sensation of chest tightness.  He has 3 doses left of prednisone taper.  No fever, chills.  He continues to work out regularly.  Eating, drinking, urinating well.  Overall, feels well but is  tired by the continued coughing.  Denies any asthma history.      Patient Care Team:  Azam Heller APRN as PCP - General (Internal Medicine)    REVIEW OF SYSTEMS     Review of Systems   Constitutional:  Negative for chills, fever and unexpected weight change.   Respiratory:  Positive for cough. Negative for chest tightness and shortness of breath.    Cardiovascular:  Negative for chest pain, palpitations and leg swelling.   Neurological:  Negative for dizziness, weakness, light-headedness and headaches.   Psychiatric/Behavioral:  The patient is not nervous/anxious.           PHYSICAL EXAMINATION     Physical Exam  Vitals reviewed.   Constitutional:       General: He is not in acute distress.     Appearance: Normal appearance. He is not ill-appearing, toxic-appearing or diaphoretic.   HENT:      Head: Normocephalic and atraumatic.      Right Ear: Tympanic membrane, ear canal and external ear normal. There is no impacted cerumen.      Left Ear: Tympanic membrane, ear canal and external ear normal. There is no impacted cerumen.      Nose: Nose normal. No congestion or rhinorrhea.      Mouth/Throat:      Mouth: Mucous membranes are moist.      Pharynx: Oropharynx is clear. No oropharyngeal exudate or posterior oropharyngeal erythema.   Cardiovascular:      Rate and Rhythm: Normal rate and regular rhythm.      Heart sounds: Normal heart sounds.   Pulmonary:      Effort: Pulmonary effort is normal.      Breath sounds: Normal breath sounds. No wheezing, rhonchi or rales.   Musculoskeletal:      Right lower leg: No edema.      Left lower leg: No edema.   Lymphadenopathy:      Cervical: No cervical adenopathy.   Neurological:      Mental Status: He is alert and oriented to person, place, and time. Mental status is at baseline.   Psychiatric:         Mood and Affect: Mood normal.         Behavior: Behavior normal.         Thought Content: Thought content normal.         Judgment: Judgment normal.           REVIEWED DATA      Labs:           Imaging:            Medical Tests:           Summary of old records / correspondence / consultant report:           Request outside records:

## 2024-01-12 DIAGNOSIS — R05.3 CHRONIC COUGH: ICD-10-CM

## 2024-01-12 RX ORDER — BUDESONIDE AND FORMOTEROL FUMARATE DIHYDRATE 80; 4.5 UG/1; UG/1
2 AEROSOL RESPIRATORY (INHALATION)
Qty: 10.2 G | Refills: 0 | Status: SHIPPED | OUTPATIENT
Start: 2024-01-12

## 2024-01-23 DIAGNOSIS — R05.3 CHRONIC COUGH: ICD-10-CM

## 2024-01-23 RX ORDER — BUDESONIDE AND FORMOTEROL FUMARATE DIHYDRATE 80; 4.5 UG/1; UG/1
2 AEROSOL RESPIRATORY (INHALATION)
Qty: 10.2 G | Refills: 1 | Status: SHIPPED | OUTPATIENT
Start: 2024-01-23 | End: 2024-01-24

## 2024-01-24 ENCOUNTER — OFFICE VISIT (OUTPATIENT)
Dept: INTERNAL MEDICINE | Age: 49
End: 2024-01-24
Payer: COMMERCIAL

## 2024-01-24 VITALS
BODY MASS INDEX: 26.81 KG/M2 | DIASTOLIC BLOOD PRESSURE: 88 MMHG | HEIGHT: 74 IN | WEIGHT: 208.9 LBS | SYSTOLIC BLOOD PRESSURE: 132 MMHG | TEMPERATURE: 98.7 F | OXYGEN SATURATION: 96 % | HEART RATE: 86 BPM

## 2024-01-24 DIAGNOSIS — Z12.11 SCREEN FOR COLON CANCER: ICD-10-CM

## 2024-01-24 DIAGNOSIS — R05.9 COUGH, UNSPECIFIED TYPE: ICD-10-CM

## 2024-01-24 DIAGNOSIS — Z12.5 SCREENING PSA (PROSTATE SPECIFIC ANTIGEN): ICD-10-CM

## 2024-01-24 DIAGNOSIS — Z00.00 ENCOUNTER FOR ANNUAL PHYSICAL EXAM: Primary | ICD-10-CM

## 2024-01-24 PROBLEM — D17.1 LIPOMA OF BACK: Status: ACTIVE | Noted: 2022-05-05

## 2024-01-24 RX ORDER — BENZONATATE 100 MG/1
100 CAPSULE ORAL 3 TIMES DAILY PRN
Qty: 30 CAPSULE | Refills: 0 | Status: SHIPPED | OUTPATIENT
Start: 2024-01-24

## 2024-01-24 NOTE — PROGRESS NOTES
"[facility] [place] [facility]  Azam Frye, ZAINAB Huy / 48 y.o. / male  2024                        VITALS     Visit Vitals  /88 (BP Location: Left arm, Patient Position: Sitting, Cuff Size: Large Adult)   Pulse 86   Temp 98.7 °F (37.1 °C) (Temporal)   Ht 188 cm (74.02\")   Wt 94.8 kg (208 lb 14.4 oz)   SpO2 96%   BMI 26.81 kg/m²       BP Readings from Last 3 Encounters:   24 132/88   24 138/76   24 (!) 191/91     Wt Readings from Last 3 Encounters:   24 94.8 kg (208 lb 14.4 oz)   24 92.1 kg (203 lb)   24 89.4 kg (197 lb)      Body mass index is 26.81 kg/m².    MEDICATIONS     Current Outpatient Medications   Medication Sig Dispense Refill    albuterol sulfate  (90 Base) MCG/ACT inhaler INHALE 2 PUFFS BY MOUTH EVERY 4 HOURS FOR WHEEZING 6.7 g 1    busPIRone (BUSPAR) 10 MG tablet Take 1 tablet by mouth 3 (Three) Times a Day. 270 tablet 1    esomeprazole (nexIUM) 40 MG capsule Take 1 capsule by mouth Every Morning Before Breakfast. 90 capsule 3    traZODone (DESYREL) 50 MG tablet Take 1 tablet by mouth Every Night. 90 tablet 1    benzonatate (Tessalon Perles) 100 MG capsule Take 1 capsule by mouth 3 (Three) Times a Day As Needed for Cough. 30 capsule 0     No current facility-administered medications for this visit.   _______________________________________________________________    CHIEF COMPLAINT     Annual Exam, Anxiety, Insomnia, and Cough      HISTORY OF PRESENT ILLNESS      Ever presents for annual health maintenance visit.    Last health maintenance visit: approximately 1 year ago  General health: excellent  Lifestyle:  Attempting to lose weight?: No   Diet: eats a well balanced, healthy diet  Exercise: exercises 5+ days weekly  Tobacco: Never used   Alcohol: does not drink  Work: Full-time  Reproductive health:  Sexually active?: No   Concern for STD?: No   Sexual problems?: No problems   Sees Urologist?: No   Depression Screenin/24/2024 "     9:32 AM   PHQ-2/PHQ-9 Depression Screening   Little Interest or Pleasure in Doing Things 0-->not at all   Feeling Down, Depressed or Hopeless 0-->not at all   PHQ-9: Brief Depression Severity Measure Score 0         PHQ-2: 0 (Not depressed)     PHQ-9: 0 (Negative screening for depression)    Patient Care Team:  Azam Heller APRN as PCP - General (Internal Medicine)  ______________________________________________________________________    ALLERGIES  No Known Allergies     PFSH:     The following portions of the patient's history were reviewed and updated as appropriate: Allergies / Current Medications / Past Medical History / Surgical History / Social History / Family History    PROBLEM LIST   Patient Active Problem List   Diagnosis    Elevated liver enzymes    Renal cyst    Gastroenteritis, acute    Lipoma of back       PAST MEDICAL HISTORY  Past Medical History:   Diagnosis Date    COVID-19     GERD (gastroesophageal reflux disease)     Retrolisthesis of vertebrae        SURGICAL HISTORY  Past Surgical History:   Procedure Laterality Date    TONSILLECTOMY         SOCIAL HISTORY  Social History     Socioeconomic History    Marital status:    Tobacco Use    Smoking status: Never     Passive exposure: Never    Smokeless tobacco: Never   Vaping Use    Vaping Use: Never used   Substance and Sexual Activity    Alcohol use: Not Currently     Comment: none 11 years     Drug use: Never    Sexual activity: Yes     Partners: Female     Birth control/protection: None       FAMILY HISTORY  Family History   Problem Relation Age of Onset    Heart disease Mother     Heart attack Mother     Emphysema Father     COPD Father     Multiple sclerosis Sister     Fibromyalgia Sister     Heart attack Maternal Grandmother     Heart attack Maternal Grandfather     Emphysema Paternal Grandfather        IMMUNIZATION HISTORY  Immunization History   Administered Date(s) Administered    COVID-19 (PFIZER) Purple Cap Monovalent  04/05/2021, 04/26/2021    TD Preservative Free (Tenivac) 11/25/2022       ______________________________________________________________________    REVIEW OF SYSTEMS     Review of Systems   Constitutional: Negative.    HENT: Negative.     Eyes: Negative.    Respiratory:  Positive for cough.    Cardiovascular: Negative.    Gastrointestinal: Negative.    Endocrine: Negative.    Genitourinary: Negative.    Musculoskeletal: Negative.    Skin: Negative.    Allergic/Immunologic: Negative.    Neurological: Negative.    Hematological: Negative.    Psychiatric/Behavioral: Negative.       PHYSICAL EXAMINATION     Physical Exam  Constitutional:       Appearance: Normal appearance.   HENT:      Head: Normocephalic and atraumatic.      Right Ear: Tympanic membrane normal.      Left Ear: Tympanic membrane normal.      Nose: Nose normal. No congestion.      Mouth/Throat:      Mouth: Mucous membranes are moist.      Pharynx: Oropharynx is clear.   Eyes:      Conjunctiva/sclera: Conjunctivae normal.      Pupils: Pupils are equal, round, and reactive to light.   Neck:      Thyroid: No thyromegaly.      Vascular: No carotid bruit.   Cardiovascular:      Rate and Rhythm: Normal rate and regular rhythm.      Pulses: Normal pulses.      Heart sounds: Normal heart sounds.   Pulmonary:      Effort: Pulmonary effort is normal.      Breath sounds: Normal breath sounds.   Abdominal:      General: Bowel sounds are normal.      Palpations: Abdomen is soft.      Tenderness: There is no right CVA tenderness or left CVA tenderness.   Genitourinary:     Comments: Followed by urology   Musculoskeletal:         General: Normal range of motion.      Cervical back: Normal range of motion.      Right lower leg: No edema.      Left lower leg: No edema.   Lymphadenopathy:      Cervical: No cervical adenopathy.   Skin:     General: Skin is warm and dry.   Neurological:      Mental Status: He is alert and oriented to person, place, and time.      Motor: No  "weakness.      Gait: Gait normal.      Deep Tendon Reflexes:      Reflex Scores:       Patellar reflexes are 2+ on the right side and 2+ on the left side.  Psychiatric:         Mood and Affect: Mood normal.         Behavior: Behavior normal.         Thought Content: Thought content normal.         Judgment: Judgment normal.       REVIEWED DATA      Labs:    Lab Results   Component Value Date     05/11/2023    K 4.7 05/11/2023    CALCIUM 9.6 05/11/2023    AST 33 05/11/2023    ALT 37 05/11/2023    BUN 27 (H) 05/11/2023    CREATININE 1.29 (H) 05/11/2023    CREATININE 1.33 (H) 01/04/2023    CREATININE 1.22 10/28/2021    EGFRIFNONA 71 10/28/2021    EGFRIFAFRI 82 10/28/2021       Lab Results   Component Value Date    HGBA1C 5.30 01/04/2023    HGBA1C 5.40 03/26/2021    TSH 1.640 01/04/2023    FREET4 1.27 01/04/2023       Lab Results   Component Value Date    PSA 0.475 01/04/2023    PSA 0.432 03/26/2021    TESTOSTEROTT 710 03/26/2021    TESTFRE >50.0 (H) 03/26/2021       Lab Results   Component Value Date     (H) 05/11/2023    HDL 27 (L) 05/11/2023    TRIG 138 05/11/2023    CHOLHDLRATIO 5.96 05/11/2023       No components found for: \"[id number]\"    Lab Results   Component Value Date    WBC 7.80 01/04/2023    HGB 17.1 01/04/2023    MCV 93.1 01/04/2023     01/04/2023       Lab Results   Component Value Date    PROTEIN Negative 01/04/2023    GLUCOSEU Negative 01/04/2023    BLOODU Negative 01/04/2023    NITRITEU Negative 01/04/2023    LEUKOCYTESUR Negative 01/04/2023          Lab Results   Component Value Date    HEPCVIRUSABY <0.1 03/26/2021       Imaging:           Medical Tests:       ______________________________________________________________________    ASSESSMENT & PLAN     ANNUAL WELLNESS EXAM / PHYSICAL     Other medical problems addressed today:  Chronic cough since December with diagnosis of bronchitis, has been given cough syrup with codeine, prednisone approximately 2 weeks ago along with " antibiotic.  Chest x-ray have remained unremarkable.  Albuterol inhaler which helped somewhat, was prescribed Symbicort but is not approved by insurance    Anxiety overall controlled with BuSpar unless he has episodes of shortness of breath have occurred recently.  Otherwise working well.  Trazodone significantly helping with sleep.    Urology following PSA    Summary/Discussion:     Cologuard for colon cancer screening as he has no family history or gastrointestinal problems.  Continue BuSpar for anxiety along with trazodone for insomnia  Continue management with urology for prostate cancer screening  Will go ahead and give him a month supply of Breztri to see if this will improve his symptoms, rinse his mouth out with every use continue use of rescue inhaler provide Tessalon Perles for cough, suspect lingering symptoms of viral illness.  Will also go ahead and order pulmonary function test to assess lung function  Follow-up in 1 year, earlier if needed depending on respiratory    Next Appointment with me: Visit date not found    Return in about 1 year (around 1/24/2025) for Annual physical.      HEALTHCARE MAINTENANCE ISSUES       Cancer Screening:  Colon: Initial/Next screening at age: DUE NOW  Repeat colon cancer screening: N/A at this time  Prostate: Sees/will see a urologist for screening.  Testicular: Recommended monthly self exam  Skin: Monthly self skin examination, annual exam by health professional  Lung: Does not meet criteria for lung cancer screening.   Other:    Screening Labs & Tests:  Lab results reviewed & discussed with with patient or orders placed today.  EKG:  CV Screening: Lipid panel  DEXA (75+ or risk factors):   HEP C (If born [birth year]32-60[birth year] or risk factors): Negative screen  Other:     Immunization/Vaccinations (to be given today unless deferred by patient)  Influenza: Patient deferred/declined flu vaccine (recommended annual vaccination)  Hepatitis A: Recommended here or at  pharmacy  Tetanus/Pertussis: Up to date  Pneumovax: Not needed at this time  Shingles: Not needed at this time  Other:     Lifestyle Counseling:  Lifestyle Modifications: Continue good lifestyle choices/modifications  Safety Issues: Always wear seatbelt, Avoid texting while driving   Use sunscreen, regular skin examination  Recommended annual dental/vision examination.  Emotional/Stress/Sleep: Reviewed and  given when appropriate      Health Maintenance   Topic Date Due    COLORECTAL CANCER SCREENING  Never done    COVID-19 Vaccine (3 - 2023-24 season) 01/26/2024 (Originally 9/1/2023)    INFLUENZA VACCINE  03/31/2024 (Originally 8/1/2023)    ANNUAL PHYSICAL  01/24/2025    BMI FOLLOWUP  01/24/2025    TDAP/TD VACCINES (2 - Tdap) 11/25/2032    HEPATITIS C SCREENING  Completed    Pneumococcal Vaccine 0-64  Aged Out     *Dragon dictation used for documentation.

## 2024-01-25 LAB
ALBUMIN SERPL-MCNC: 4.1 G/DL (ref 4.1–5.1)
ALBUMIN/GLOB SERPL: 2.2 {RATIO} (ref 1.2–2.2)
ALP SERPL-CCNC: 39 IU/L (ref 44–121)
ALT SERPL-CCNC: 44 IU/L (ref 0–44)
AST SERPL-CCNC: 48 IU/L (ref 0–40)
BASOPHILS # BLD AUTO: 0 X10E3/UL (ref 0–0.2)
BASOPHILS NFR BLD AUTO: 1 %
BILIRUB SERPL-MCNC: 0.5 MG/DL (ref 0–1.2)
BUN SERPL-MCNC: 25 MG/DL (ref 6–24)
BUN/CREAT SERPL: 19 (ref 9–20)
CALCIUM SERPL-MCNC: 9 MG/DL (ref 8.7–10.2)
CHLORIDE SERPL-SCNC: 102 MMOL/L (ref 96–106)
CHOLEST SERPL-MCNC: 166 MG/DL (ref 100–199)
CHOLEST/HDLC SERPL: 5.9 RATIO (ref 0–5)
CO2 SERPL-SCNC: 25 MMOL/L (ref 20–29)
CREAT SERPL-MCNC: 1.33 MG/DL (ref 0.76–1.27)
EGFRCR SERPLBLD CKD-EPI 2021: 66 ML/MIN/1.73
EOSINOPHIL # BLD AUTO: 0.1 X10E3/UL (ref 0–0.4)
EOSINOPHIL NFR BLD AUTO: 1 %
ERYTHROCYTE [DISTWIDTH] IN BLOOD BY AUTOMATED COUNT: 13.4 % (ref 11.6–15.4)
GLOBULIN SER CALC-MCNC: 1.9 G/DL (ref 1.5–4.5)
GLUCOSE SERPL-MCNC: 71 MG/DL (ref 70–99)
GLUCOSE UR QL STRIP: NORMAL
HBA1C MFR BLD: 6 % (ref 4.8–5.6)
HCT VFR BLD AUTO: 53 % (ref 37.5–51)
HDLC SERPL-MCNC: 28 MG/DL
HGB BLD-MCNC: 17.6 G/DL (ref 13–17.7)
IMM GRANULOCYTES # BLD AUTO: 0.1 X10E3/UL (ref 0–0.1)
IMM GRANULOCYTES NFR BLD AUTO: 1 %
KETONES UR QL STRIP: NORMAL
LDLC SERPL CALC-MCNC: 116 MG/DL (ref 0–99)
LYMPHOCYTES # BLD AUTO: 1.2 X10E3/UL (ref 0.7–3.1)
LYMPHOCYTES NFR BLD AUTO: 15 %
MCH RBC QN AUTO: 30.2 PG (ref 26.6–33)
MCHC RBC AUTO-ENTMCNC: 33.2 G/DL (ref 31.5–35.7)
MCV RBC AUTO: 91 FL (ref 79–97)
MONOCYTES # BLD AUTO: 0.7 X10E3/UL (ref 0.1–0.9)
MONOCYTES NFR BLD AUTO: 9 %
NEUTROPHILS # BLD AUTO: 5.6 X10E3/UL (ref 1.4–7)
NEUTROPHILS NFR BLD AUTO: 73 %
PH UR STRIP: NORMAL [PH]
PLATELET # BLD AUTO: 194 X10E3/UL (ref 150–450)
POTASSIUM SERPL-SCNC: 4.9 MMOL/L (ref 3.5–5.2)
PROT SERPL-MCNC: 6 G/DL (ref 6–8.5)
PROT UR QL STRIP: NORMAL
RBC # BLD AUTO: 5.83 X10E6/UL (ref 4.14–5.8)
SODIUM SERPL-SCNC: 140 MMOL/L (ref 134–144)
SP GR UR STRIP: NORMAL
SPECIMEN STATUS: NORMAL
T4 FREE SERPL-MCNC: 1.17 NG/DL (ref 0.82–1.77)
TRIGL SERPL-MCNC: 119 MG/DL (ref 0–149)
TSH SERPL DL<=0.005 MIU/L-ACNC: 1.79 UIU/ML (ref 0.45–4.5)
UNABLE TO VOID: NORMAL
VLDLC SERPL CALC-MCNC: 22 MG/DL (ref 5–40)
WBC # BLD AUTO: 7.7 X10E3/UL (ref 3.4–10.8)

## 2024-02-04 DIAGNOSIS — F41.9 ANXIETY: ICD-10-CM

## 2024-02-05 RX ORDER — BUSPIRONE HYDROCHLORIDE 10 MG/1
10 TABLET ORAL 3 TIMES DAILY
Qty: 270 TABLET | Refills: 1 | Status: SHIPPED | OUTPATIENT
Start: 2024-02-05

## 2024-02-06 ENCOUNTER — HOSPITAL ENCOUNTER (OUTPATIENT)
Dept: RESPIRATORY THERAPY | Facility: HOSPITAL | Age: 49
Discharge: HOME | End: 2024-02-06
Admitting: NURSE PRACTITIONER
Payer: COMMERCIAL

## 2024-02-06 DIAGNOSIS — R05.9 COUGH, UNSPECIFIED TYPE: ICD-10-CM

## 2024-02-06 PROCEDURE — 94640 AIRWAY INHALATION TREATMENT: CPT

## 2024-02-06 PROCEDURE — 94729 DIFFUSING CAPACITY: CPT

## 2024-02-06 PROCEDURE — 94060 EVALUATION OF WHEEZING: CPT

## 2024-02-06 PROCEDURE — 94726 PLETHYSMOGRAPHY LUNG VOLUMES: CPT

## 2024-02-06 RX ORDER — ALBUTEROL SULFATE 2.5 MG/3ML
2.5 SOLUTION RESPIRATORY (INHALATION) ONCE
Status: COMPLETED | OUTPATIENT
Start: 2024-02-06 | End: 2024-02-06

## 2024-02-06 RX ADMIN — ALBUTEROL SULFATE 2.5 MG: 2.5 SOLUTION RESPIRATORY (INHALATION) at 13:15

## 2024-02-17 DIAGNOSIS — G47.00 INSOMNIA, UNSPECIFIED TYPE: ICD-10-CM

## 2024-02-19 RX ORDER — TRAZODONE HYDROCHLORIDE 50 MG/1
50 TABLET ORAL NIGHTLY
Qty: 90 TABLET | Refills: 1 | Status: SHIPPED | OUTPATIENT
Start: 2024-02-19

## 2024-02-22 DIAGNOSIS — R09.81 SINUS CONGESTION: Primary | ICD-10-CM

## 2024-02-22 RX ORDER — AZITHROMYCIN 250 MG/1
TABLET, FILM COATED ORAL
Qty: 6 TABLET | Refills: 0 | Status: SHIPPED | OUTPATIENT
Start: 2024-02-22

## 2024-02-22 RX ORDER — PREDNISONE 20 MG/1
40 TABLET ORAL DAILY
Qty: 6 TABLET | Refills: 0 | Status: SHIPPED | OUTPATIENT
Start: 2024-02-22 | End: 2024-02-25

## 2024-02-22 RX ORDER — DEXTROMETHORPHAN HYDROBROMIDE AND PROMETHAZINE HYDROCHLORIDE 15; 6.25 MG/5ML; MG/5ML
5 SYRUP ORAL 4 TIMES DAILY PRN
Qty: 118 ML | Refills: 0 | Status: SHIPPED | OUTPATIENT
Start: 2024-02-22

## 2024-03-13 DIAGNOSIS — I10 PRIMARY HYPERTENSION: Primary | ICD-10-CM

## 2024-03-13 RX ORDER — LISINOPRIL 10 MG/1
10 TABLET ORAL DAILY
Qty: 90 TABLET | Refills: 1 | Status: SHIPPED | OUTPATIENT
Start: 2024-03-13

## 2024-03-28 RX ORDER — CEFDINIR 300 MG/1
300 CAPSULE ORAL 2 TIMES DAILY
Qty: 14 CAPSULE | Refills: 0 | Status: SHIPPED | OUTPATIENT
Start: 2024-03-28 | End: 2024-04-04

## 2024-03-29 DIAGNOSIS — R09.81 CHRONIC NASAL CONGESTION: Primary | ICD-10-CM

## 2024-04-04 DIAGNOSIS — R05.3 CHRONIC COUGHING: Primary | ICD-10-CM

## 2024-04-05 ENCOUNTER — LAB (OUTPATIENT)
Facility: HOSPITAL | Age: 49
End: 2024-04-05
Payer: COMMERCIAL

## 2024-04-05 ENCOUNTER — HOSPITAL ENCOUNTER (OUTPATIENT)
Facility: HOSPITAL | Age: 49
Discharge: HOME | End: 2024-04-05
Payer: COMMERCIAL

## 2024-04-05 DIAGNOSIS — R05.3 CHRONIC COUGHING: Primary | ICD-10-CM

## 2024-04-05 DIAGNOSIS — R05.3 CHRONIC COUGHING: ICD-10-CM

## 2024-04-05 DIAGNOSIS — B99.9 RECURRENT INFECTIONS: Primary | ICD-10-CM

## 2024-04-05 DIAGNOSIS — D80.3 IGG DEFICIENCY: ICD-10-CM

## 2024-04-05 LAB
IGA1 MFR SER: 99 MG/DL (ref 70–400)
IGG1 SER-MCNC: 639 MG/DL (ref 700–1600)
IGM SERPL-MCNC: 73 MG/DL (ref 40–230)

## 2024-04-05 PROCEDURE — 82784 ASSAY IGA/IGD/IGG/IGM EACH: CPT

## 2024-04-05 PROCEDURE — 87205 SMEAR GRAM STAIN: CPT | Performed by: NURSE PRACTITIONER

## 2024-04-05 PROCEDURE — 36415 COLL VENOUS BLD VENIPUNCTURE: CPT

## 2024-04-05 PROCEDURE — 71046 X-RAY EXAM CHEST 2 VIEWS: CPT

## 2024-04-05 PROCEDURE — 87070 CULTURE OTHR SPECIMN AEROBIC: CPT | Performed by: NURSE PRACTITIONER

## 2024-04-08 DIAGNOSIS — R05.3 CHRONIC COUGH: Primary | ICD-10-CM

## 2024-04-11 DIAGNOSIS — R05.3 CHRONIC COUGH: Primary | ICD-10-CM

## 2024-04-12 ENCOUNTER — HOSPITAL ENCOUNTER (OUTPATIENT)
Facility: HOSPITAL | Age: 49
Discharge: HOME | End: 2024-04-12
Payer: COMMERCIAL

## 2024-04-12 DIAGNOSIS — R05.3 CHRONIC COUGHING: ICD-10-CM

## 2024-04-12 DIAGNOSIS — R05.3 CHRONIC COUGH: ICD-10-CM

## 2024-04-12 PROCEDURE — 71250 CT THORAX DX C-: CPT

## 2024-04-16 DIAGNOSIS — I10 PRIMARY HYPERTENSION: Primary | ICD-10-CM

## 2024-04-16 DIAGNOSIS — K21.9 GASTROESOPHAGEAL REFLUX DISEASE, UNSPECIFIED WHETHER ESOPHAGITIS PRESENT: ICD-10-CM

## 2024-04-16 RX ORDER — LOSARTAN POTASSIUM 50 MG/1
50 TABLET ORAL DAILY
Qty: 90 TABLET | Refills: 1 | Status: SHIPPED | OUTPATIENT
Start: 2024-04-16

## 2024-04-16 RX ORDER — PANTOPRAZOLE SODIUM 40 MG/1
40 TABLET, DELAYED RELEASE ORAL DAILY
Qty: 90 TABLET | Refills: 1 | Status: SHIPPED | OUTPATIENT
Start: 2024-04-16

## 2024-04-21 RX ORDER — ESOMEPRAZOLE MAGNESIUM 40 MG/1
CAPSULE, DELAYED RELEASE ORAL
Qty: 90 CAPSULE | Refills: 3 | OUTPATIENT
Start: 2024-04-21

## 2024-06-06 DIAGNOSIS — R06.02 SHORTNESS OF BREATH: ICD-10-CM

## 2024-06-06 DIAGNOSIS — G47.00 INSOMNIA, UNSPECIFIED TYPE: ICD-10-CM

## 2024-06-07 RX ORDER — TRAZODONE HYDROCHLORIDE 50 MG/1
50 TABLET ORAL NIGHTLY
Qty: 90 TABLET | Refills: 1 | Status: SHIPPED | OUTPATIENT
Start: 2024-06-07

## 2024-06-07 RX ORDER — ALBUTEROL SULFATE 90 UG/1
1 AEROSOL, METERED RESPIRATORY (INHALATION)
Qty: 6.7 G | Refills: 1 | Status: SHIPPED | OUTPATIENT
Start: 2024-06-07

## 2024-07-23 ENCOUNTER — TELEPHONE (OUTPATIENT)
Dept: INTERNAL MEDICINE | Age: 49
End: 2024-07-23

## 2024-07-23 DIAGNOSIS — Z12.5 SCREENING PSA (PROSTATE SPECIFIC ANTIGEN): ICD-10-CM

## 2024-07-23 DIAGNOSIS — G47.00 INSOMNIA, UNSPECIFIED TYPE: ICD-10-CM

## 2024-07-23 DIAGNOSIS — R68.82 DIMINISHED LIBIDO: Primary | ICD-10-CM

## 2024-07-23 DIAGNOSIS — I10 PRIMARY HYPERTENSION: ICD-10-CM

## 2024-07-23 RX ORDER — ESOMEPRAZOLE MAGNESIUM 40 MG/1
CAPSULE, DELAYED RELEASE ORAL
Qty: 90 CAPSULE | Refills: 3 | Status: SHIPPED | OUTPATIENT
Start: 2024-07-23

## 2024-07-23 RX ORDER — TRAZODONE HYDROCHLORIDE 50 MG/1
50 TABLET ORAL NIGHTLY
Qty: 90 TABLET | Refills: 1 | Status: SHIPPED | OUTPATIENT
Start: 2024-07-23

## 2024-07-23 NOTE — TELEPHONE ENCOUNTER
Hub staff attempted to follow warm transfer process and was unsuccessful     Caller: Eevr Villagran    Relationship to patient: Self    Best call back number: [phone]     Patient is needing: PATIENT IS RETURNING A MISSED CALL FROM THE OFFICE. PLEASE CALL PATIENT BACK.

## 2024-08-08 DIAGNOSIS — R76.8 HEPATITIS B CORE ANTIBODY POSITIVE: Primary | ICD-10-CM

## 2024-08-09 ENCOUNTER — TELEPHONE (OUTPATIENT)
Dept: INTERNAL MEDICINE | Age: 49
End: 2024-08-09

## 2024-08-09 NOTE — TELEPHONE ENCOUNTER
Hub staff attempted to follow warm transfer process and was unsuccessful     Caller: Ever Villagran    Relationship to patient: Self    Best call back number: [phone]     Patient is needing: NEEDING TO SCHEDULE LAB APPOINTMENT

## 2024-08-12 NOTE — TELEPHONE ENCOUNTER
Hub staff attempted to follow warm transfer process and was unsuccessful     Caller: Ever Villagran    Relationship to patient: Self    Best call back number:     [phone] (Mobile)       Patient is needing:  NEEDING TO SCHEDULE LABS FOR UPCOMING APPT

## 2024-08-14 RX ORDER — TADALAFIL 20 MG/1
20 TABLET ORAL DAILY PRN
Qty: 30 TABLET | Refills: 0 | Status: SHIPPED | OUTPATIENT
Start: 2024-08-14

## 2024-12-02 DIAGNOSIS — F41.9 ANXIETY: ICD-10-CM

## 2024-12-02 RX ORDER — BUSPIRONE HYDROCHLORIDE 10 MG/1
10 TABLET ORAL 3 TIMES DAILY
Qty: 270 TABLET | Refills: 1 | Status: SHIPPED | OUTPATIENT
Start: 2024-12-02

## 2024-12-15 DIAGNOSIS — R06.02 SHORTNESS OF BREATH: ICD-10-CM

## 2024-12-16 RX ORDER — ALBUTEROL SULFATE 90 UG/1
1 INHALANT RESPIRATORY (INHALATION)
Qty: 6.7 G | Refills: 1 | Status: SHIPPED | OUTPATIENT
Start: 2024-12-16

## 2024-12-16 RX ORDER — TADALAFIL 20 MG/1
20 TABLET ORAL DAILY PRN
Qty: 30 TABLET | Refills: 0 | Status: SHIPPED | OUTPATIENT
Start: 2024-12-16

## 2024-12-30 DIAGNOSIS — I10 PRIMARY HYPERTENSION: ICD-10-CM

## 2024-12-30 RX ORDER — LOSARTAN POTASSIUM 50 MG/1
50 TABLET ORAL DAILY
Qty: 90 TABLET | Refills: 1 | Status: SHIPPED | OUTPATIENT
Start: 2024-12-30

## 2025-01-14 DIAGNOSIS — G47.00 INSOMNIA, UNSPECIFIED TYPE: ICD-10-CM

## 2025-01-14 RX ORDER — TRAZODONE HYDROCHLORIDE 50 MG/1
50 TABLET, FILM COATED ORAL NIGHTLY
Qty: 90 TABLET | Refills: 1 | Status: SHIPPED | OUTPATIENT
Start: 2025-01-14

## 2025-01-27 ENCOUNTER — OFFICE VISIT (OUTPATIENT)
Dept: INTERNAL MEDICINE | Age: 50
End: 2025-01-27
Payer: COMMERCIAL

## 2025-01-27 VITALS
OXYGEN SATURATION: 95 % | BODY MASS INDEX: 26.28 KG/M2 | HEIGHT: 74 IN | SYSTOLIC BLOOD PRESSURE: 108 MMHG | HEART RATE: 87 BPM | WEIGHT: 204.8 LBS | TEMPERATURE: 97.4 F | DIASTOLIC BLOOD PRESSURE: 78 MMHG

## 2025-01-27 DIAGNOSIS — Z12.5 ENCOUNTER FOR SCREENING FOR MALIGNANT NEOPLASM OF PROSTATE: ICD-10-CM

## 2025-01-27 DIAGNOSIS — Z12.11 SCREENING FOR MALIGNANT NEOPLASM OF COLON: ICD-10-CM

## 2025-01-27 DIAGNOSIS — J40 SINOBRONCHITIS: ICD-10-CM

## 2025-01-27 DIAGNOSIS — J32.9 SINOBRONCHITIS: ICD-10-CM

## 2025-01-27 DIAGNOSIS — E29.1 HYPOGONADISM IN MALE: ICD-10-CM

## 2025-01-27 DIAGNOSIS — I10 PRIMARY HYPERTENSION: ICD-10-CM

## 2025-01-27 DIAGNOSIS — Z00.00 ENCOUNTER FOR ANNUAL PHYSICAL EXAM: Primary | ICD-10-CM

## 2025-01-27 PROCEDURE — 99396 PREV VISIT EST AGE 40-64: CPT | Performed by: NURSE PRACTITIONER

## 2025-01-27 PROCEDURE — 99214 OFFICE O/P EST MOD 30 MIN: CPT | Performed by: NURSE PRACTITIONER

## 2025-01-27 RX ORDER — CEFDINIR 300 MG/1
300 CAPSULE ORAL 2 TIMES DAILY
Qty: 14 CAPSULE | Refills: 0 | Status: SHIPPED | OUTPATIENT
Start: 2025-01-27 | End: 2025-02-03

## 2025-01-27 NOTE — PROGRESS NOTES
"                     S K Y L E R    F R Y E ,   D N P ,  A P R N                  I  N  T  E  R  N  A  L    M  E  D  I  C  I  N  E         ENCOUNTER DATE:  01/27/2025    Ever Villagran / 49 y.o. / male    ANNUAL PREVENTATIVE / PHYSICAL ENCOUNTER       CHIEF COMPLAINT     Annual Exam, Bronchitis, and Hypertension      VITALS     Vitals:    01/27/25 0944   BP: 108/78   Pulse: 87   Temp: 97.4 °F (36.3 °C)   SpO2: 95%   Weight: 92.9 kg (204 lb 12.8 oz)   Height: 188 cm (74.02\")       BP Readings from Last 3 Encounters:   01/27/25 108/78   01/17/25 140/73   01/24/24 132/88     Wt Readings from Last 3 Encounters:   01/27/25 92.9 kg (204 lb 12.8 oz)   01/17/25 90.7 kg (200 lb)   01/24/24 94.8 kg (208 lb 14.4 oz)      Body mass index is 26.28 kg/m².    MEDICATIONS     Current Outpatient Medications on File Prior to Visit   Medication Sig Dispense Refill    albuterol sulfate  (90 Base) MCG/ACT inhaler Inhale 1 puff 4 (Four) Times a Day. (Patient taking differently: Inhale 1 puff As Needed.) 6.7 g 1    busPIRone (BUSPAR) 10 MG tablet Take 1 tablet by mouth 3 (Three) Times a Day. 270 tablet 1    esomeprazole (nexIUM) 40 MG capsule TAKE ONE CAPSULE BY MOUTH EVERY MORNING BEFORE BREAKFAST 90 capsule 3    losartan (Cozaar) 50 MG tablet Take 1 tablet by mouth Daily. 90 tablet 1    tadalafil (Cialis) 20 MG tablet Take 1 tablet by mouth Daily As Needed for Erectile Dysfunction. 30 tablet 0    traZODone (DESYREL) 50 MG tablet Take 1 tablet by mouth Every Night. 90 tablet 1    [DISCONTINUED] benzonatate (TESSALON) 200 MG capsule Take 1 capsule by mouth 3 (Three) Times a Day As Needed for Cough. (Patient not taking: Reported on 1/27/2025) 30 capsule 0    [DISCONTINUED] methylPREDNISolone (MEDROL) 4 MG dose pack Take as directed on package instructions. (Patient not taking: Reported on 1/27/2025) 21 tablet 0     No current facility-administered medications on file prior to visit.       HISTORY OF PRESENT ILLNESS      Ever presents " for annual health maintenance visit.    Patient was recently seen by urgent care January 17, 2025 for bronchitis at that time SARS and flu negative.  He was provided with Medrol Dosepak along with Tessalon Perles.  Symptoms have improved in the chest but now he has sinus   congestion and pain.    Blood pressure well-controlled with losartan.  Causes erectile dysfunction but this is controlled with Cialis.  Would like to continue current regimen    Patient Care Team:  Azam Heller, VILMA, APRN as PCP - General (Internal Medicine)    General health: excellent  Lifestyle:  Attempting to lose weight?: No   Diet: eats a well balanced, healthy diet  Exercise:  almost everyday exercise   Tobacco: Never used   Alcohol: does not drink  Work: Full-time  Reproductive health:  Sexually active?: Yes   Concern for STD?: No   Sexual problems?: ED  Sees Urologist?: No   Depression Screening:      PHQ-2 Depression Screening  Little interest or pleasure in doing things? Not at all   Feeling down, depressed, or hopeless? Not at all   PHQ-2 Total Score 0      PHQ-2: 0 (Not depressed)     PHQ-9: 0 (Negative screening for depression)    ______________________________________________________________________    ALLERGIES  No Known Allergies     PFSH:     The following portions of the patient's history were reviewed and updated as appropriate: Allergies / Current Medications / Past Medical History / Surgical History / Social History / Family History    PROBLEM LIST   Patient Active Problem List   Diagnosis    Elevated liver enzymes    Renal cyst    Gastroenteritis, acute    Lipoma of back    Primary hypertension       PAST MEDICAL HISTORY  Past Medical History:   Diagnosis Date    COVID-19     GERD (gastroesophageal reflux disease)     Retrolisthesis of vertebrae        SURGICAL HISTORY  Past Surgical History:   Procedure Laterality Date    TONSILLECTOMY         SOCIAL HISTORY  Social History     Socioeconomic History    Marital status:     Tobacco Use    Smoking status: Never     Passive exposure: Never    Smokeless tobacco: Never   Vaping Use    Vaping status: Never Used   Substance and Sexual Activity    Alcohol use: Not Currently     Comment: none 11 years     Drug use: Never    Sexual activity: Yes     Partners: Female     Birth control/protection: None       FAMILY HISTORY  Family History   Problem Relation Age of Onset    Heart disease Mother     Heart attack Mother     Emphysema Father     COPD Father     Multiple sclerosis Sister     Fibromyalgia Sister     Heart attack Maternal Grandmother     Heart attack Maternal Grandfather     Emphysema Paternal Grandfather        IMMUNIZATION HISTORY  Immunization History   Administered Date(s) Administered    COVID-19 (PFIZER) Purple Cap Monovalent 04/05/2021, 04/26/2021    TD Preservative Free (Tenivac) 11/25/2022         REVIEW OF SYSTEMS     Review of Systems  Constitutional: Negative.    HENT: Sinus congestion and pain  Eyes: Negative.    Respiratory:  Positive for cough.    Cardiovascular: Negative.    Gastrointestinal: Negative.    Endocrine: Negative.    Genitourinary: Negative.    Musculoskeletal: Negative.    Skin: Negative.    Allergic/Immunologic: Negative.    Neurological: Negative.    Hematological: Negative.    Psychiatric/Behavioral: Negative.       PHYSICAL EXAMINATION     Physical Exam  Constitutional:       Appearance: Normal appearance.   HENT:      Head: Normocephalic and atraumatic.      Right Ear: Tympanic membrane normal.      Left Ear: Tympanic membrane normal.      Nose: Nose normal. No congestion.      Mouth/Throat:      Mouth: Mucous membranes are moist.      Pharynx: Oropharynx is clear.   Eyes:      Conjunctiva/sclera: Conjunctivae normal.      Pupils: Pupils are equal, round, and reactive to light.   Neck:      Thyroid: No thyromegaly.      Vascular: No carotid bruit.   Cardiovascular:      Rate and Rhythm: Normal rate and regular rhythm.      Pulses: Normal  "pulses.      Heart sounds: Normal heart sounds.   Pulmonary:      Effort: Pulmonary effort is normal.      Breath sounds: Normal breath sounds.   Abdominal:      General: Bowel sounds are normal.      Palpations: Abdomen is soft.      Tenderness: There is no right CVA tenderness or left CVA tenderness.   Genitourinary:     Comments: Followed by urology   Musculoskeletal:         General: Normal range of motion.      Cervical back: Normal range of motion.      Right lower leg: No edema.      Left lower leg: No edema.   Lymphadenopathy:      Cervical: No cervical adenopathy.   Skin:     General: Skin is warm and dry.   Neurological:      Mental Status: He is alert and oriented to person, place, and time.      Motor: No weakness.      Gait: Gait normal.      Deep Tendon Reflexes:      Reflex Scores:       Patellar reflexes are 2+ on the right side and 2+ on the left side.  Psychiatric:         Mood and Affect: Mood normal.         Behavior: Behavior normal.         Thought Content: Thought content normal.         Judgment: Judgment normal.        REVIEWED DATA      Labs:    Lab Results   Component Value Date     07/31/2024    K 4.4 07/31/2024    CALCIUM 8.8 07/31/2024    AST 38 07/31/2024    ALT 51 (H) 07/31/2024    BUN 21 (H) 07/31/2024    CREATININE 1.21 07/31/2024    CREATININE 1.33 (H) 01/24/2024    CREATININE 1.29 (H) 05/11/2023    EGFRIFNONA 71 10/28/2021    EGFRIFAFRI 82 10/28/2021     Lab Results   Component Value Date    GLUCOSE 67 07/31/2024    HGBA1C 5.60 07/31/2024    HGBA1C 6.0 (H) 01/24/2024    HGBA1C 5.30 01/04/2023    TSH 2.160 07/31/2024    FREET4 1.30 07/31/2024     Lab Results   Component Value Date    PSA 0.475 01/04/2023    PSA 0.432 03/26/2021     Lab Results   Component Value Date    LDL 95 07/31/2024    HDL 28 (L) 07/31/2024    TRIG 119 07/31/2024    CHOLHDLRATIO 5.18 07/31/2024   No components found for: \"LROW339T\"  Lab Results   Component Value Date    WBC 5.78 07/31/2024    HGB 15.7 " 07/31/2024    MCV 88.6 07/31/2024     07/31/2024     Lab Results   Component Value Date    PROTEIN CANCELED 01/24/2024    GLUCOSEU CANCELED 01/24/2024    BLOODU Negative 01/04/2023    NITRITEU Negative 01/04/2023    LEUKOCYTESUR Negative 01/04/2023     Lab Results   Component Value Date    HEPCVIRUSABY <0.1 03/26/2021       Imaging:                   Medical Tests:                 Summary of old records / correspondence / consultant report:               Request outside records:         ASSESSMENT & PLAN     ANNUAL WELLNESS EXAM / PHYSICAL     Other medical problems addressed today:  Problem List Items Addressed This Visit       Primary hypertension    Relevant Medications    losartan (Cozaar) 50 MG tablet     Other Visit Diagnoses       Encounter for annual physical exam    -  Primary    Relevant Orders    Comprehensive Metabolic Panel    Lipid Panel With / Chol / HDL Ratio    CBC & Differential    Hemoglobin A1c    TSH+Free T4    Urinalysis With Microscopic If Indicated (No Culture) - Urine, Clean Catch    Encounter for screening for malignant neoplasm of prostate        Relevant Orders    PSA Screen    Sinobronchitis        Relevant Medications    cefdinir (OMNICEF) 300 MG capsule    Hypogonadism in male        Relevant Orders    Testosterone, Free, Total    Screening for malignant neoplasm of colon        Relevant Orders    Cologuard - Stool, Per Rectum             Summary/Discussion:     Patient is doing overall well except for lingering sinobronchitis will treat with cefdinir, follow-up if no improvement in symptoms.  Cologuard for colon cancer screening  Continuing treatment of hypogonadism through hormone clinic with testosterone every Monday  Blood pressure well-controlled.  Continue all current medications and follow-up in 6 months for chronic medications    Next Appointment with me: Visit date not found    Return in about 6 months (around 7/27/2025) for Next scheduled follow up.      HEALTHCARE  MAINTENANCE ISSUES     Cancer Screening:  Colon: Initial/Next screening at age: due now   Repeat colon cancer screening: N/A at this time  Prostate: Continue screening annually.   Testicular: Recommended monthly self exam  Skin: Monthly self skin examination, annual exam by health professional  Lung: Does not meet criteria for lung cancer screening.   Other:    Screening Labs & Tests:  Lab results reviewed & discussed with with patient or orders placed today.  EKG:  CV Screening: Lipid panel  DEXA (75+ or risk factors):   HEP C (If born [birth year]50-[birth year] or risk factors): Negative screen  Other:     Immunization/Vaccinations (to be given today unless deferred by patient)  Influenza: Patient deferred/declined flu vaccine (recommended annual vaccination)  Hepatitis A: Declined by patient  Hepatitis B: Declined by patient  Tetanus/Pertussis: Declined by patient  Pneumococcal: Declined by patient  Shingles: Patient declines vaccine  COVID: Does not plan to get the latest booster  RSV: Not indicated    Lifestyle Counseling:  Lifestyle Modifications: Continue good lifestyle choices/modifications  Safety Issues: Always wear seatbelt, Avoid texting while driving   Use sunscreen, regular skin examination  Recommended annual dental/vision examination.  Emotional/Stress/Sleep: Reviewed and  given when appropriate      Health Maintenance   Topic Date Due    COLORECTAL CANCER SCREENING  Never done    INFLUENZA VACCINE  Never done    COVID-19 Vaccine (3 - 2024-25 season) 09/01/2024    ANNUAL PHYSICAL  01/24/2025    BMI FOLLOWUP  01/24/2025    TDAP/TD VACCINES (2 - Tdap) 11/25/2032    HEPATITIS C SCREENING  Completed    Pneumococcal Vaccine 0-64  Aged Out

## 2025-01-29 LAB
ALBUMIN SERPL-MCNC: 4 G/DL (ref 3.5–5.2)
ALBUMIN/GLOB SERPL: 1.7 G/DL
ALP SERPL-CCNC: 51 U/L (ref 39–117)
ALT SERPL-CCNC: 30 U/L (ref 1–41)
APPEARANCE UR: CLEAR
AST SERPL-CCNC: 29 U/L (ref 1–40)
BACTERIA #/AREA URNS HPF: NORMAL /HPF
BASOPHILS # BLD AUTO: 0.04 10*3/MM3 (ref 0–0.2)
BASOPHILS NFR BLD AUTO: 0.4 % (ref 0–1.5)
BILIRUB SERPL-MCNC: 0.3 MG/DL (ref 0–1.2)
BILIRUB UR QL STRIP: NEGATIVE
BUN SERPL-MCNC: 20 MG/DL (ref 6–20)
BUN/CREAT SERPL: 14.9 (ref 7–25)
CALCIUM SERPL-MCNC: 9.2 MG/DL (ref 8.6–10.5)
CASTS URNS MICRO: NORMAL
CHLORIDE SERPL-SCNC: 103 MMOL/L (ref 98–107)
CHOLEST SERPL-MCNC: 146 MG/DL (ref 0–200)
CHOLEST/HDLC SERPL: 4.56 {RATIO}
CO2 SERPL-SCNC: 26.7 MMOL/L (ref 22–29)
COLOR UR: ABNORMAL
CREAT SERPL-MCNC: 1.34 MG/DL (ref 0.76–1.27)
EGFRCR SERPLBLD CKD-EPI 2021: 65.3 ML/MIN/1.73
EOSINOPHIL # BLD AUTO: 0.07 10*3/MM3 (ref 0–0.4)
EOSINOPHIL NFR BLD AUTO: 0.8 % (ref 0.3–6.2)
EPI CELLS #/AREA URNS HPF: NORMAL /HPF
ERYTHROCYTE [DISTWIDTH] IN BLOOD BY AUTOMATED COUNT: 12.6 % (ref 12.3–15.4)
GLOBULIN SER CALC-MCNC: 2.3 GM/DL
GLUCOSE SERPL-MCNC: 77 MG/DL (ref 65–99)
GLUCOSE UR QL STRIP: NEGATIVE
HBA1C MFR BLD: 5.8 % (ref 4.8–5.6)
HCT VFR BLD AUTO: 51 % (ref 37.5–51)
HDLC SERPL-MCNC: 32 MG/DL (ref 40–60)
HGB BLD-MCNC: 17.3 G/DL (ref 13–17.7)
HGB UR QL STRIP: NEGATIVE
IMM GRANULOCYTES # BLD AUTO: 0.02 10*3/MM3 (ref 0–0.05)
IMM GRANULOCYTES NFR BLD AUTO: 0.2 % (ref 0–0.5)
KETONES UR QL STRIP: ABNORMAL
LDLC SERPL CALC-MCNC: 85 MG/DL (ref 0–100)
LEUKOCYTE ESTERASE UR QL STRIP: ABNORMAL
LYMPHOCYTES # BLD AUTO: 1.59 10*3/MM3 (ref 0.7–3.1)
LYMPHOCYTES NFR BLD AUTO: 17.5 % (ref 19.6–45.3)
MCH RBC QN AUTO: 29.8 PG (ref 26.6–33)
MCHC RBC AUTO-ENTMCNC: 33.9 G/DL (ref 31.5–35.7)
MCV RBC AUTO: 87.8 FL (ref 79–97)
MONOCYTES # BLD AUTO: 0.92 10*3/MM3 (ref 0.1–0.9)
MONOCYTES NFR BLD AUTO: 10.1 % (ref 5–12)
NEUTROPHILS # BLD AUTO: 6.43 10*3/MM3 (ref 1.7–7)
NEUTROPHILS NFR BLD AUTO: 71 % (ref 42.7–76)
NITRITE UR QL STRIP: NEGATIVE
NRBC BLD AUTO-RTO: 0 /100 WBC (ref 0–0.2)
PH UR STRIP: 6 [PH] (ref 5–8)
PLATELET # BLD AUTO: 206 10*3/MM3 (ref 140–450)
POTASSIUM SERPL-SCNC: 5.1 MMOL/L (ref 3.5–5.2)
PROT SERPL-MCNC: 6.3 G/DL (ref 6–8.5)
PROT UR QL STRIP: ABNORMAL
PSA SERPL-MCNC: 0.49 NG/ML (ref 0–4)
RBC # BLD AUTO: 5.81 10*6/MM3 (ref 4.14–5.8)
RBC #/AREA URNS HPF: NORMAL /HPF
SODIUM SERPL-SCNC: 145 MMOL/L (ref 136–145)
SP GR UR STRIP: >1.03 (ref 1–1.03)
T4 FREE SERPL-MCNC: 1.25 NG/DL (ref 0.92–1.68)
TESTOST FREE SERPL-MCNC: 26.7 PG/ML (ref 6.8–21.5)
TESTOST SERPL-MCNC: 1064 NG/DL (ref 264–916)
TRIGL SERPL-MCNC: 166 MG/DL (ref 0–150)
TSH SERPL DL<=0.005 MIU/L-ACNC: 2.43 UIU/ML (ref 0.27–4.2)
UROBILINOGEN UR STRIP-MCNC: ABNORMAL MG/DL
VLDLC SERPL CALC-MCNC: 29 MG/DL (ref 5–40)
WBC # BLD AUTO: 9.07 10*3/MM3 (ref 3.4–10.8)
WBC #/AREA URNS HPF: NORMAL /HPF

## 2025-03-24 DIAGNOSIS — M77.9 BONE SPUR: ICD-10-CM

## 2025-03-24 DIAGNOSIS — M79.643 PAIN OF HAND, UNSPECIFIED LATERALITY: Primary | ICD-10-CM

## 2025-06-24 ENCOUNTER — HOSPITAL ENCOUNTER (OUTPATIENT)
Facility: HOSPITAL | Age: 50
Discharge: HOME | End: 2025-06-24
Admitting: NURSE PRACTITIONER
Payer: COMMERCIAL

## 2025-06-24 DIAGNOSIS — M25.531 RIGHT WRIST PAIN: ICD-10-CM

## 2025-06-24 DIAGNOSIS — M25.531 RIGHT WRIST PAIN: Primary | ICD-10-CM

## 2025-06-24 PROCEDURE — 73110 X-RAY EXAM OF WRIST: CPT

## 2025-06-25 DIAGNOSIS — I10 PRIMARY HYPERTENSION: ICD-10-CM

## 2025-06-25 RX ORDER — LOSARTAN POTASSIUM 50 MG/1
50 TABLET ORAL DAILY
Qty: 90 TABLET | Refills: 1 | Status: SHIPPED | OUTPATIENT
Start: 2025-06-25

## 2025-07-01 ENCOUNTER — OFFICE VISIT (OUTPATIENT)
Dept: INTERNAL MEDICINE | Age: 50
End: 2025-07-01
Payer: COMMERCIAL

## 2025-07-01 VITALS
DIASTOLIC BLOOD PRESSURE: 83 MMHG | HEART RATE: 72 BPM | SYSTOLIC BLOOD PRESSURE: 130 MMHG | RESPIRATION RATE: 16 BRPM | TEMPERATURE: 97.7 F | OXYGEN SATURATION: 96 % | WEIGHT: 205 LBS | BODY MASS INDEX: 26.31 KG/M2 | HEIGHT: 74 IN

## 2025-07-01 DIAGNOSIS — G47.00 INSOMNIA, UNSPECIFIED TYPE: ICD-10-CM

## 2025-07-01 DIAGNOSIS — J01.90 ACUTE SINUSITIS, RECURRENCE NOT SPECIFIED, UNSPECIFIED LOCATION: ICD-10-CM

## 2025-07-01 DIAGNOSIS — R09.81 SINUS CONGESTION: ICD-10-CM

## 2025-07-01 DIAGNOSIS — N52.9 ERECTILE DYSFUNCTION, UNSPECIFIED ERECTILE DYSFUNCTION TYPE: Primary | ICD-10-CM

## 2025-07-01 PROCEDURE — 99213 OFFICE O/P EST LOW 20 MIN: CPT | Performed by: NURSE PRACTITIONER

## 2025-07-01 RX ORDER — METHYLPREDNISOLONE 4 MG/1
TABLET ORAL
Qty: 21 TABLET | Refills: 0 | Status: SHIPPED | OUTPATIENT
Start: 2025-07-01

## 2025-07-01 RX ORDER — AZELASTINE 1 MG/ML
2 SPRAY, METERED NASAL 2 TIMES DAILY
Qty: 30 ML | Refills: 1 | Status: SHIPPED | OUTPATIENT
Start: 2025-07-01

## 2025-07-01 RX ORDER — SILDENAFIL 100 MG/1
100 TABLET, FILM COATED ORAL DAILY PRN
Qty: 30 TABLET | Refills: 1 | Status: SHIPPED | OUTPATIENT
Start: 2025-07-01

## 2025-07-01 RX ORDER — AZITHROMYCIN 250 MG/1
TABLET, FILM COATED ORAL
Qty: 6 TABLET | Refills: 1 | Status: SHIPPED | OUTPATIENT
Start: 2025-07-01

## 2025-07-01 RX ORDER — TRAZODONE HYDROCHLORIDE 100 MG/1
100 TABLET ORAL NIGHTLY
Qty: 90 TABLET | Refills: 1 | Status: SHIPPED | OUTPATIENT
Start: 2025-07-01

## 2025-07-01 NOTE — PROGRESS NOTES
I N T E R N A L  M E D I C I N E  ZAINAB WEISS      ENCOUNTER DATE:  07/01/2025    Ever Villagran / 50 y.o. / male      CHIEF COMPLAINT / REASON FOR OFFICE VISIT     Cough and Nasal Congestion      ASSESSMENT & PLAN     Problem List Items Addressed This Visit    None  Visit Diagnoses         Erectile dysfunction, unspecified erectile dysfunction type    -  Primary    Relevant Medications    sildenafil (Viagra) 100 MG tablet    Other Relevant Orders    Ambulatory Referral to Urology      Acute sinusitis, recurrence not specified, unspecified location        Relevant Medications    azithromycin (Zithromax Z-Wing) 250 MG tablet    methylPREDNISolone (MEDROL) 4 MG dose pack      Sinus congestion        Relevant Medications    azelastine (ASTELIN) 0.1 % nasal spray      Insomnia, unspecified type        Relevant Medications    traZODone (DESYREL) 100 MG tablet          Orders Placed This Encounter   Procedures    Ambulatory Referral to Urology     New Medications Ordered This Visit   Medications    sildenafil (Viagra) 100 MG tablet     Sig: Take 1 tablet by mouth Daily As Needed for Erectile Dysfunction.     Dispense:  30 tablet     Refill:  1    azithromycin (Zithromax Z-Wing) 250 MG tablet     Sig: Take 2 tablets by mouth on day 1, then 1 tablet daily on days 2-5     Dispense:  6 tablet     Refill:  1    methylPREDNISolone (MEDROL) 4 MG dose pack     Sig: Take as directed on package instructions.     Dispense:  21 tablet     Refill:  0    azelastine (ASTELIN) 0.1 % nasal spray     Sig: Administer 2 sprays into the nostril(s) as directed by provider 2 (Two) Times a Day. Use in each nostril as directed     Dispense:  30 mL     Refill:  1    traZODone (DESYREL) 100 MG tablet     Sig: Take 1 tablet by mouth Every Night.     Dispense:  90 tablet     Refill:  1       SUMMARY/DISCUSSION  From Cialis to Viagra to see if this will help along with a referral to urology for erectile dysfunction.  Add azelastine follow-up  "Medrol Dosepak and Z-Wing for sinusitis      Next Appointment with me: 7/29/2025    No follow-ups on file.      VITAL SIGNS     Visit Vitals  /83   Pulse 72   Temp 97.7 °F (36.5 °C)   Resp 16   Ht 188 cm (74.02\")   Wt 93 kg (205 lb)   SpO2 96%   BMI 26.31 kg/m²       Wt Readings from Last 3 Encounters:   07/01/25 93 kg (205 lb)   01/27/25 92.9 kg (204 lb 12.8 oz)   01/17/25 90.7 kg (200 lb)     Body mass index is 26.31 kg/m².      MEDICATIONS AT THE TIME OF OFFICE VISIT     Current Outpatient Medications on File Prior to Visit   Medication Sig    albuterol sulfate  (90 Base) MCG/ACT inhaler Inhale 1 puff 4 (Four) Times a Day. (Patient taking differently: Inhale 1 puff As Needed.)    busPIRone (BUSPAR) 10 MG tablet Take 1 tablet by mouth 3 (Three) Times a Day.    esomeprazole (nexIUM) 40 MG capsule TAKE ONE CAPSULE BY MOUTH EVERY MORNING BEFORE BREAKFAST    Fexofenadine HCl (ALLEGRA ALLERGY PO) Take 10 mg by mouth Daily.    losartan (COZAAR) 50 MG tablet TAKE 1 TABLET BY MOUTH DAILY    [DISCONTINUED] traZODone (DESYREL) 50 MG tablet Take 1 tablet by mouth Every Night.    [DISCONTINUED] tadalafil (Cialis) 20 MG tablet Take 1 tablet by mouth Daily As Needed for Erectile Dysfunction.     No current facility-administered medications on file prior to visit.          HISTORY OF PRESENT ILLNESS     Over the last few weeks patient has been having worsening sinus congestion postnasal drip and intermittent cough.  He has been using his rescue inhaler before jujitsu.  Has fluticasone or Flonase at home along with an oral antihistamine which he has just darted.    Erectile dysfunction.  Testosterone adequate has testosterone treatment outside of our office.  No significant anxiety related.    REVIEW OF SYSTEMS     Constitutional neg except per HPI     PHYSICAL EXAMINATION     Physical Exam  Constitutional  No distress  ENT PND,congestion   Cardiovascular Rate  normal . Rhythm: regular . Heart sounds:  " normal  Pulmonary/Chest  Effort normal. Breath sounds:  normal  Psychiatric  Alert. Judgment and thought content normal. Mood normal      REVIEWED DATA     Labs:   Lab Results   Component Value Date    GLUCOSE 77 01/27/2025    BUN 20 01/27/2025    CREATININE 1.34 (H) 01/27/2025    EGFR 65.3 01/27/2025    BCR 14.9 01/27/2025    K 5.1 01/27/2025    CO2 26.7 01/27/2025    CALCIUM 9.2 01/27/2025    ALBUMIN 4.0 01/27/2025    BILITOT 0.3 01/27/2025    AST 29 01/27/2025    ALT 30 01/27/2025     Lab Results   Component Value Date    WBC 9.07 01/27/2025    HGB 17.3 01/27/2025    HCT 51.0 01/27/2025    MCV 87.8 01/27/2025     01/27/2025     Lab Results   Component Value Date    CHLPL 146 01/27/2025    TRIG 166 (H) 01/27/2025    HDL 32 (L) 01/27/2025    LDL 85 01/27/2025      Lab Results   Component Value Date    TSH 2.430 01/27/2025     Brief Urine Lab Results  (Last result in the past 365 days)        Color   Clarity   Blood   Leuk Est   Nitrite   Protein   CREAT   Urine HCG        01/27/25 1035 See below:  Comment: Dark Yellow  REFERENCE RANGE: Yellow, Straw     Clear   Negative   Trace   Negative   Trace                 Lab Results   Component Value Date    HGBA1C 5.80 (H) 01/27/2025       Imaging:           Medical Tests:           Summary of old records / correspondence / consultant report:           Request outside records:

## 2025-07-06 DIAGNOSIS — F41.9 ANXIETY: ICD-10-CM

## 2025-07-06 RX ORDER — BUSPIRONE HYDROCHLORIDE 10 MG/1
10 TABLET ORAL 3 TIMES DAILY
Qty: 270 TABLET | Refills: 1 | Status: SHIPPED | OUTPATIENT
Start: 2025-07-06

## 2025-07-10 DIAGNOSIS — J01.90 ACUTE SINUSITIS, RECURRENCE NOT SPECIFIED, UNSPECIFIED LOCATION: ICD-10-CM

## 2025-07-10 RX ORDER — METHYLPREDNISOLONE 4 MG/1
TABLET ORAL
Qty: 21 TABLET | Refills: 0 | Status: SHIPPED | OUTPATIENT
Start: 2025-07-10

## 2025-07-21 RX ORDER — ESOMEPRAZOLE MAGNESIUM 40 MG/1
40 CAPSULE, DELAYED RELEASE ORAL
Qty: 90 CAPSULE | Refills: 3 | Status: SHIPPED | OUTPATIENT
Start: 2025-07-21

## 2025-07-29 ENCOUNTER — OFFICE VISIT (OUTPATIENT)
Dept: INTERNAL MEDICINE | Age: 50
End: 2025-07-29
Payer: COMMERCIAL

## 2025-07-29 VITALS
TEMPERATURE: 96.8 F | SYSTOLIC BLOOD PRESSURE: 112 MMHG | BODY MASS INDEX: 25.98 KG/M2 | DIASTOLIC BLOOD PRESSURE: 64 MMHG | HEIGHT: 74 IN | OXYGEN SATURATION: 95 % | HEART RATE: 65 BPM | WEIGHT: 202.4 LBS

## 2025-07-29 DIAGNOSIS — N52.9 ERECTILE DYSFUNCTION, UNSPECIFIED ERECTILE DYSFUNCTION TYPE: ICD-10-CM

## 2025-07-29 DIAGNOSIS — F41.9 ANXIETY: ICD-10-CM

## 2025-07-29 DIAGNOSIS — I10 PRIMARY HYPERTENSION: ICD-10-CM

## 2025-07-29 DIAGNOSIS — R73.01 IMPAIRED FASTING BLOOD SUGAR: ICD-10-CM

## 2025-07-29 DIAGNOSIS — E78.1 HYPERTRIGLYCERIDEMIA: Primary | ICD-10-CM

## 2025-07-29 PROCEDURE — 99214 OFFICE O/P EST MOD 30 MIN: CPT | Performed by: NURSE PRACTITIONER

## 2025-07-29 NOTE — PROGRESS NOTES
"    I N T E R N A L  M E D I C I N E  MARA JACOBOZAINAB      ENCOUNTER DATE:  07/29/2025    Ever Villagran / 50 y.o. / male      CHIEF COMPLAINT / REASON FOR OFFICE VISIT     Hypertension, Hyperlipidemia, Erectile Dysfunction, and Anxiety      ASSESSMENT & PLAN     Problem List Items Addressed This Visit       Primary hypertension    Relevant Medications    losartan (COZAAR) 50 MG tablet    sildenafil (Viagra) 100 MG tablet     Other Visit Diagnoses         Hypertriglyceridemia    -  Primary    Relevant Orders    Homocysteine    Comprehensive Metabolic Panel    Lipid Panel With / Chol / HDL Ratio    CBC & Differential    NMR LipoProfile      Impaired fasting blood sugar        Relevant Orders    Hemoglobin A1c      Erectile dysfunction, unspecified erectile dysfunction type        Relevant Orders    Urinalysis With Microscopic If Indicated (No Culture) - Urine, Clean Catch    Testosterone, Free, Total      Anxiety              Orders Placed This Encounter   Procedures    Homocysteine    Comprehensive Metabolic Panel    Lipid Panel With / Chol / HDL Ratio    Hemoglobin A1c    Urinalysis With Microscopic If Indicated (No Culture) - Urine, Clean Catch    Testosterone, Free, Total    NMR LipoProfile    CBC & Differential     No orders of the defined types were placed in this encounter.      SUMMARY/DISCUSSION  Continue current medication regimen for anxiety, erectile dysfunction hypertension.  Laboratory updates today.    Next Appointment with me: Visit date not found    Return in about 6 months (around 1/29/2026) for Annual physical with labs same day .      VITAL SIGNS     Visit Vitals  /64   Pulse 65   Temp 96.8 °F (36 °C)   Ht 188 cm (74.02\")   Wt 91.8 kg (202 lb 6.4 oz)   SpO2 95%   BMI 25.98 kg/m²       Wt Readings from Last 3 Encounters:   07/29/25 91.8 kg (202 lb 6.4 oz)   07/01/25 93 kg (205 lb)   01/27/25 92.9 kg (204 lb 12.8 oz)     Body mass index is 25.98 kg/m².      MEDICATIONS AT THE TIME OF OFFICE " VISIT     Current Outpatient Medications on File Prior to Visit   Medication Sig    albuterol sulfate  (90 Base) MCG/ACT inhaler Inhale 1 puff 4 (Four) Times a Day. (Patient taking differently: Inhale 1 puff As Needed.)    busPIRone (BUSPAR) 10 MG tablet TAKE 1 TABLET BY MOUTH 3 TIMES A DAY    esomeprazole (nexIUM) 40 MG capsule TAKE ONE CAPSULE BY MOUTH EVERY MORNING BEFORE BREAKFAST    losartan (COZAAR) 50 MG tablet TAKE 1 TABLET BY MOUTH DAILY    sildenafil (Viagra) 100 MG tablet Take 1 tablet by mouth Daily As Needed for Erectile Dysfunction.    traZODone (DESYREL) 100 MG tablet Take 1 tablet by mouth Every Night.    [DISCONTINUED] azelastine (ASTELIN) 0.1 % nasal spray Administer 2 sprays into the nostril(s) as directed by provider 2 (Two) Times a Day. Use in each nostril as directed (Patient not taking: Reported on 7/29/2025)    [DISCONTINUED] azithromycin (Zithromax Z-Wing) 250 MG tablet Take 2 tablets by mouth on day 1, then 1 tablet daily on days 2-5 (Patient not taking: Reported on 7/29/2025)    [DISCONTINUED] Fexofenadine HCl (ALLEGRA ALLERGY PO) Take 10 mg by mouth Daily. (Patient not taking: Reported on 7/29/2025)    [DISCONTINUED] methylPREDNISolone (MEDROL) 4 MG dose pack TAKE BY MOUTH AS INSTRUCTED - PER PACKAGE INSTRUCTIONS (Patient not taking: Reported on 7/29/2025)     No current facility-administered medications on file prior to visit.          HISTORY OF PRESENT ILLNESS     Anxiety well-controlled with BuSpar twice daily.  Has third dose if needed.  Insomnia stable with trazodone.    Blood pressure stable on losartan 50 mg daily.  No chest pain no shortness of breath no current wheezing or lower extremity swelling.    Erectile dysfunction with Viagra as needed.  Controlling symptoms well.    REVIEW OF SYSTEMS     Constitutional neg except per HPI   Resp neg  CV neg     PHYSICAL EXAMINATION     Physical Exam  Constitutional  No distress  Cardiovascular Rate  normal . Rhythm: regular . Heart  sounds:  normal  Pulmonary/Chest  Effort normal. Breath sounds:  normal  Psychiatric  Alert. Judgment and thought content normal. Mood normal      REVIEWED DATA     Labs:   Lab Results   Component Value Date    GLUCOSE 77 01/27/2025    BUN 20 01/27/2025    CREATININE 1.34 (H) 01/27/2025    EGFR 65.3 01/27/2025    BCR 14.9 01/27/2025    K 5.1 01/27/2025    CO2 26.7 01/27/2025    CALCIUM 9.2 01/27/2025    ALBUMIN 4.0 01/27/2025    BILITOT 0.3 01/27/2025    AST 29 01/27/2025    ALT 30 01/27/2025     Lab Results   Component Value Date    WBC 9.07 01/27/2025    HGB 17.3 01/27/2025    HCT 51.0 01/27/2025    MCV 87.8 01/27/2025     01/27/2025     Lab Results   Component Value Date    CHLPL 146 01/27/2025    TRIG 166 (H) 01/27/2025    HDL 32 (L) 01/27/2025    LDL 85 01/27/2025      Lab Results   Component Value Date    TSH 2.430 01/27/2025     Brief Urine Lab Results  (Last result in the past 365 days)        Color   Clarity   Blood   Leuk Est   Nitrite   Protein   CREAT   Urine HCG        01/27/25 1035 See below:  Comment: Dark Yellow  REFERENCE RANGE: Yellow, Straw     Clear   Negative   Trace   Negative   Trace                 Lab Results   Component Value Date    HGBA1C 5.80 (H) 01/27/2025       Imaging:           Medical Tests:           Summary of old records / correspondence / consultant report:           Request outside records:

## 2025-08-02 LAB
ALBUMIN SERPL-MCNC: 4.3 G/DL (ref 4.1–5.1)
ALP SERPL-CCNC: 44 IU/L (ref 44–121)
ALT SERPL-CCNC: 47 IU/L (ref 0–44)
APPEARANCE UR: CLEAR
AST SERPL-CCNC: 36 IU/L (ref 0–40)
BASOPHILS # BLD AUTO: 0 X10E3/UL (ref 0–0.2)
BASOPHILS NFR BLD AUTO: 1 %
BILIRUB SERPL-MCNC: 0.5 MG/DL (ref 0–1.2)
BILIRUB UR QL STRIP: NEGATIVE
BUN SERPL-MCNC: 31 MG/DL (ref 6–24)
BUN/CREAT SERPL: 25 (ref 9–20)
CALCIUM SERPL-MCNC: 9.4 MG/DL (ref 8.7–10.2)
CHLORIDE SERPL-SCNC: 101 MMOL/L (ref 96–106)
CHOLEST SERPL-MCNC: 152 MG/DL (ref 100–199)
CHOLEST SERPL-MCNC: 154 MG/DL (ref 100–199)
CHOLEST/HDLC SERPL: 3.6 RATIO (ref 0–5)
CO2 SERPL-SCNC: 25 MMOL/L (ref 20–29)
COLOR UR: YELLOW
CREAT SERPL-MCNC: 1.24 MG/DL (ref 0.76–1.27)
EGFRCR SERPLBLD CKD-EPI 2021: 71 ML/MIN/1.73
EOSINOPHIL # BLD AUTO: 0.1 X10E3/UL (ref 0–0.4)
EOSINOPHIL NFR BLD AUTO: 2 %
ERYTHROCYTE [DISTWIDTH] IN BLOOD BY AUTOMATED COUNT: 14 % (ref 11.6–15.4)
GLOBULIN SER CALC-MCNC: 1.9 G/DL (ref 1.5–4.5)
GLUCOSE SERPL-MCNC: 89 MG/DL (ref 70–99)
GLUCOSE UR QL STRIP: NEGATIVE
HBA1C MFR BLD: 5.8 % (ref 4.8–5.6)
HCT VFR BLD AUTO: 49.5 % (ref 37.5–51)
HCYS SERPL-SCNC: 10.3 UMOL/L (ref 0–14.5)
HDL SERPL-SCNC: 30.6 UMOL/L
HDLC SERPL-MCNC: 42 MG/DL
HDLC SERPL-MCNC: 43 MG/DL
HGB BLD-MCNC: 16.1 G/DL (ref 13–17.7)
HGB UR QL STRIP: NEGATIVE
IMM GRANULOCYTES # BLD AUTO: 0 X10E3/UL (ref 0–0.1)
IMM GRANULOCYTES NFR BLD AUTO: 0 %
KETONES UR QL STRIP: NEGATIVE
LDL SERPL QN: 21.3 NM
LDL SERPL-SCNC: 1119 NMOL/L
LDL SMALL SERPL-SCNC: 326 NMOL/L
LDLC SERPL CALC-MCNC: 96 MG/DL (ref 0–99)
LDLC SERPL CALC-MCNC: 97 MG/DL (ref 0–99)
LEUKOCYTE ESTERASE UR QL STRIP: NEGATIVE
LYMPHOCYTES # BLD AUTO: 1.3 X10E3/UL (ref 0.7–3.1)
LYMPHOCYTES NFR BLD AUTO: 30 %
MCH RBC QN AUTO: 30.3 PG (ref 26.6–33)
MCHC RBC AUTO-ENTMCNC: 32.5 G/DL (ref 31.5–35.7)
MCV RBC AUTO: 93 FL (ref 79–97)
MICRO URNS: NORMAL
MONOCYTES # BLD AUTO: 0.4 X10E3/UL (ref 0.1–0.9)
MONOCYTES NFR BLD AUTO: 10 %
NEUTROPHILS # BLD AUTO: 2.4 X10E3/UL (ref 1.4–7)
NEUTROPHILS NFR BLD AUTO: 57 %
NITRITE UR QL STRIP: NEGATIVE
PH UR STRIP: 6.5 [PH] (ref 5–7.5)
PLATELET # BLD AUTO: 163 X10E3/UL (ref 150–450)
POTASSIUM SERPL-SCNC: 4.8 MMOL/L (ref 3.5–5.2)
PROT SERPL-MCNC: 6.2 G/DL (ref 6–8.5)
PROT UR QL STRIP: NORMAL
RBC # BLD AUTO: 5.32 X10E6/UL (ref 4.14–5.8)
SODIUM SERPL-SCNC: 139 MMOL/L (ref 134–144)
SP GR UR STRIP: 1.03 (ref 1–1.03)
TESTOST FREE SERPL-MCNC: 0.4 PG/ML (ref 6.8–21.5)
TESTOST SERPL-MCNC: 67 NG/DL (ref 264–916)
TRIGL SERPL-MCNC: 72 MG/DL (ref 0–149)
TRIGL SERPL-MCNC: 73 MG/DL (ref 0–149)
UROBILINOGEN UR STRIP-MCNC: 0.2 MG/DL (ref 0.2–1)
VLDLC SERPL CALC-MCNC: 14 MG/DL (ref 5–40)
WBC # BLD AUTO: 4.3 X10E3/UL (ref 3.4–10.8)

## 2025-08-08 RX ORDER — AZITHROMYCIN 250 MG/1
TABLET, FILM COATED ORAL
Qty: 6 TABLET | Refills: 0 | Status: SHIPPED | OUTPATIENT
Start: 2025-08-08

## 2025-08-15 RX ORDER — METHYLPREDNISOLONE 4 MG/1
TABLET ORAL
Qty: 21 TABLET | Refills: 0 | Status: SHIPPED | OUTPATIENT
Start: 2025-08-15

## 2025-08-21 DIAGNOSIS — R79.89 ELEVATED SERUM CREATININE: Primary | ICD-10-CM
